# Patient Record
Sex: MALE | Race: WHITE | NOT HISPANIC OR LATINO | Employment: FULL TIME | URBAN - METROPOLITAN AREA
[De-identification: names, ages, dates, MRNs, and addresses within clinical notes are randomized per-mention and may not be internally consistent; named-entity substitution may affect disease eponyms.]

---

## 2017-04-26 ENCOUNTER — AMBULATORY - HEALTHEAST (OUTPATIENT)
Dept: LAB | Facility: CLINIC | Age: 42
End: 2017-04-26

## 2017-04-26 DIAGNOSIS — Z30.09 VASECTOMY EVALUATION: ICD-10-CM

## 2017-04-28 ENCOUNTER — AMBULATORY - HEALTHEAST (OUTPATIENT)
Dept: LAB | Facility: CLINIC | Age: 42
End: 2017-04-28

## 2017-04-28 DIAGNOSIS — Z30.09 VASECTOMY EVALUATION: ICD-10-CM

## 2017-08-23 ENCOUNTER — OFFICE VISIT - HEALTHEAST (OUTPATIENT)
Dept: FAMILY MEDICINE | Facility: CLINIC | Age: 42
End: 2017-08-23

## 2017-08-23 DIAGNOSIS — M77.11 LATERAL EPICONDYLITIS OF RIGHT ELBOW: ICD-10-CM

## 2017-08-23 ASSESSMENT — MIFFLIN-ST. JEOR: SCORE: 1971.84

## 2017-10-09 ENCOUNTER — AMBULATORY - HEALTHEAST (OUTPATIENT)
Dept: LAB | Facility: CLINIC | Age: 42
End: 2017-10-09

## 2017-10-09 DIAGNOSIS — Z13.9 SCREENING FOR CONDITION: ICD-10-CM

## 2017-10-10 ENCOUNTER — COMMUNICATION - HEALTHEAST (OUTPATIENT)
Dept: LAB | Facility: CLINIC | Age: 42
End: 2017-10-10

## 2017-12-06 ENCOUNTER — RECORDS - HEALTHEAST (OUTPATIENT)
Dept: ADMINISTRATIVE | Facility: OTHER | Age: 42
End: 2017-12-06

## 2017-12-28 ENCOUNTER — COMMUNICATION - HEALTHEAST (OUTPATIENT)
Dept: INTERNAL MEDICINE | Facility: CLINIC | Age: 42
End: 2017-12-28

## 2017-12-28 ENCOUNTER — RECORDS - HEALTHEAST (OUTPATIENT)
Dept: ADMINISTRATIVE | Facility: OTHER | Age: 42
End: 2017-12-28

## 2018-01-11 ENCOUNTER — OFFICE VISIT - HEALTHEAST (OUTPATIENT)
Dept: INTERNAL MEDICINE | Facility: CLINIC | Age: 43
End: 2018-01-11

## 2018-01-11 DIAGNOSIS — E78.1 HYPERTRIGLYCERIDEMIA: ICD-10-CM

## 2018-01-11 DIAGNOSIS — E11.9 TYPE 2 DIABETES MELLITUS WITHOUT COMPLICATION, WITHOUT LONG-TERM CURRENT USE OF INSULIN (H): ICD-10-CM

## 2018-01-11 DIAGNOSIS — E88.810 DYSMETABOLIC SYNDROME: ICD-10-CM

## 2018-01-11 DIAGNOSIS — R79.89 ELEVATED LFTS: ICD-10-CM

## 2018-01-11 LAB
ALBUMIN SERPL-MCNC: 4.1 G/DL (ref 3.5–5)
ALBUMIN UR-MCNC: NEGATIVE MG/DL
ALP SERPL-CCNC: 81 U/L (ref 45–120)
ALT SERPL W P-5'-P-CCNC: 110 U/L (ref 0–45)
ANION GAP SERPL CALCULATED.3IONS-SCNC: 10 MMOL/L (ref 5–18)
APPEARANCE UR: CLEAR
AST SERPL W P-5'-P-CCNC: 51 U/L (ref 0–40)
BILIRUB SERPL-MCNC: 0.6 MG/DL (ref 0–1)
BILIRUB UR QL STRIP: NEGATIVE
BUN SERPL-MCNC: 14 MG/DL (ref 8–22)
CALCIUM SERPL-MCNC: 10 MG/DL (ref 8.5–10.5)
CHLORIDE BLD-SCNC: 99 MMOL/L (ref 98–107)
CO2 SERPL-SCNC: 27 MMOL/L (ref 22–31)
COLOR UR AUTO: YELLOW
CREAT SERPL-MCNC: 0.91 MG/DL (ref 0.7–1.3)
CREAT UR-MCNC: 157.5 MG/DL
GFR SERPL CREATININE-BSD FRML MDRD: >60 ML/MIN/1.73M2
GLUCOSE BLD-MCNC: 181 MG/DL (ref 70–125)
GLUCOSE UR STRIP-MCNC: ABNORMAL MG/DL
HBA1C MFR BLD: 7.3 % (ref 3.5–6)
HGB UR QL STRIP: NEGATIVE
KETONES UR STRIP-MCNC: NEGATIVE MG/DL
LDLC SERPL CALC-MCNC: 174 MG/DL
LEUKOCYTE ESTERASE UR QL STRIP: NEGATIVE
MICROALBUMIN UR-MCNC: 1.61 MG/DL (ref 0–1.99)
MICROALBUMIN/CREAT UR: 10.2 MG/G
NITRATE UR QL: NEGATIVE
PH UR STRIP: 6 [PH] (ref 5–8)
POTASSIUM BLD-SCNC: 4.3 MMOL/L (ref 3.5–5)
PROT SERPL-MCNC: 7.3 G/DL (ref 6–8)
SODIUM SERPL-SCNC: 136 MMOL/L (ref 136–145)
SP GR UR STRIP: 1.02 (ref 1–1.03)
UROBILINOGEN UR STRIP-ACNC: ABNORMAL

## 2018-01-11 ASSESSMENT — MIFFLIN-ST. JEOR: SCORE: 2072.76

## 2018-01-12 ENCOUNTER — COMMUNICATION - HEALTHEAST (OUTPATIENT)
Dept: INTERNAL MEDICINE | Facility: CLINIC | Age: 43
End: 2018-01-12

## 2018-01-12 LAB
HBV SURFACE AG SERPL QL IA: NEGATIVE
HCV AB SERPL QL IA: NEGATIVE

## 2018-04-30 ENCOUNTER — OFFICE VISIT - HEALTHEAST (OUTPATIENT)
Dept: INTERNAL MEDICINE | Facility: CLINIC | Age: 43
End: 2018-04-30

## 2018-04-30 DIAGNOSIS — E11.9 TYPE 2 DIABETES MELLITUS (H): ICD-10-CM

## 2018-04-30 DIAGNOSIS — E55.9 VITAMIN D DEFICIENCY: ICD-10-CM

## 2018-04-30 DIAGNOSIS — E78.5 HYPERLIPEMIA: ICD-10-CM

## 2018-04-30 DIAGNOSIS — R73.9 HYPERGLYCEMIA: ICD-10-CM

## 2018-04-30 LAB
ALBUMIN SERPL-MCNC: 4.1 G/DL (ref 3.5–5)
ALP SERPL-CCNC: 95 U/L (ref 45–120)
ALT SERPL W P-5'-P-CCNC: 115 U/L (ref 0–45)
ANION GAP SERPL CALCULATED.3IONS-SCNC: 8 MMOL/L (ref 5–18)
AST SERPL W P-5'-P-CCNC: 60 U/L (ref 0–40)
BILIRUB SERPL-MCNC: 0.4 MG/DL (ref 0–1)
BUN SERPL-MCNC: 13 MG/DL (ref 8–22)
CALCIUM SERPL-MCNC: 9.4 MG/DL (ref 8.5–10.5)
CHLORIDE BLD-SCNC: 105 MMOL/L (ref 98–107)
CHOLEST SERPL-MCNC: 142 MG/DL
CO2 SERPL-SCNC: 26 MMOL/L (ref 22–31)
CREAT SERPL-MCNC: 0.91 MG/DL (ref 0.7–1.3)
FASTING STATUS PATIENT QL REPORTED: YES
GFR SERPL CREATININE-BSD FRML MDRD: >60 ML/MIN/1.73M2
GLUCOSE BLD-MCNC: 149 MG/DL (ref 70–125)
HBA1C MFR BLD: 7.2 % (ref 3.5–6)
HDLC SERPL-MCNC: 43 MG/DL
LDLC SERPL CALC-MCNC: 71 MG/DL
POTASSIUM BLD-SCNC: 5.1 MMOL/L (ref 3.5–5)
PROT SERPL-MCNC: 7.3 G/DL (ref 6–8)
SODIUM SERPL-SCNC: 139 MMOL/L (ref 136–145)
TRIGL SERPL-MCNC: 142 MG/DL

## 2018-04-30 ASSESSMENT — MIFFLIN-ST. JEOR: SCORE: 2050.37

## 2018-05-01 ENCOUNTER — COMMUNICATION - HEALTHEAST (OUTPATIENT)
Dept: INTERNAL MEDICINE | Facility: CLINIC | Age: 43
End: 2018-05-01

## 2018-05-01 LAB — 25(OH)D3 SERPL-MCNC: 48.2 NG/ML (ref 30–80)

## 2018-07-05 ENCOUNTER — COMMUNICATION - HEALTHEAST (OUTPATIENT)
Dept: INTERNAL MEDICINE | Facility: CLINIC | Age: 43
End: 2018-07-05

## 2018-07-06 ENCOUNTER — COMMUNICATION - HEALTHEAST (OUTPATIENT)
Dept: INTERNAL MEDICINE | Facility: CLINIC | Age: 43
End: 2018-07-06

## 2018-10-15 ENCOUNTER — OFFICE VISIT - HEALTHEAST (OUTPATIENT)
Dept: INTERNAL MEDICINE | Facility: CLINIC | Age: 43
End: 2018-10-15

## 2018-10-15 ENCOUNTER — COMMUNICATION - HEALTHEAST (OUTPATIENT)
Dept: INTERNAL MEDICINE | Facility: CLINIC | Age: 43
End: 2018-10-15

## 2018-10-15 DIAGNOSIS — E88.810 DYSMETABOLIC SYNDROME: ICD-10-CM

## 2018-10-15 DIAGNOSIS — E11.9 TYPE 2 DIABETES MELLITUS WITHOUT COMPLICATION, WITHOUT LONG-TERM CURRENT USE OF INSULIN (H): ICD-10-CM

## 2018-10-15 DIAGNOSIS — E78.5 HYPERLIPIDEMIA: ICD-10-CM

## 2018-10-15 LAB
ALBUMIN SERPL-MCNC: 4.4 G/DL (ref 3.5–5)
ALP SERPL-CCNC: 95 U/L (ref 45–120)
ALT SERPL W P-5'-P-CCNC: 89 U/L (ref 0–45)
ANION GAP SERPL CALCULATED.3IONS-SCNC: 11 MMOL/L (ref 5–18)
AST SERPL W P-5'-P-CCNC: 49 U/L (ref 0–40)
BILIRUB SERPL-MCNC: 0.8 MG/DL (ref 0–1)
BUN SERPL-MCNC: 13 MG/DL (ref 8–22)
CALCIUM SERPL-MCNC: 9.6 MG/DL (ref 8.5–10.5)
CHLORIDE BLD-SCNC: 100 MMOL/L (ref 98–107)
CHOLEST SERPL-MCNC: 137 MG/DL
CO2 SERPL-SCNC: 27 MMOL/L (ref 22–31)
CREAT SERPL-MCNC: 0.95 MG/DL (ref 0.7–1.3)
FASTING STATUS PATIENT QL REPORTED: YES
GFR SERPL CREATININE-BSD FRML MDRD: >60 ML/MIN/1.73M2
GLUCOSE BLD-MCNC: 138 MG/DL (ref 70–125)
HBA1C MFR BLD: 7.8 % (ref 3.5–6)
HDLC SERPL-MCNC: 40 MG/DL
LDLC SERPL CALC-MCNC: 54 MG/DL
POTASSIUM BLD-SCNC: 4 MMOL/L (ref 3.5–5)
PROT SERPL-MCNC: 7.2 G/DL (ref 6–8)
SODIUM SERPL-SCNC: 138 MMOL/L (ref 136–145)
TRIGL SERPL-MCNC: 215 MG/DL

## 2018-10-15 ASSESSMENT — MIFFLIN-ST. JEOR: SCORE: 2053.49

## 2018-10-16 ENCOUNTER — COMMUNICATION - HEALTHEAST (OUTPATIENT)
Dept: INTERNAL MEDICINE | Facility: CLINIC | Age: 43
End: 2018-10-16

## 2018-10-18 ENCOUNTER — COMMUNICATION - HEALTHEAST (OUTPATIENT)
Dept: PHARMACY | Facility: CLINIC | Age: 43
End: 2018-10-18

## 2018-10-19 ENCOUNTER — OFFICE VISIT - HEALTHEAST (OUTPATIENT)
Dept: PHARMACY | Facility: CLINIC | Age: 43
End: 2018-10-19

## 2018-10-19 DIAGNOSIS — E78.1 HYPERTRIGLYCERIDEMIA: ICD-10-CM

## 2018-10-19 DIAGNOSIS — E55.9 VITAMIN D DEFICIENCY: ICD-10-CM

## 2018-10-19 DIAGNOSIS — E11.8 TYPE 2 DIABETES MELLITUS WITH COMPLICATION, WITHOUT LONG-TERM CURRENT USE OF INSULIN (H): ICD-10-CM

## 2018-11-01 ENCOUNTER — COMMUNICATION - HEALTHEAST (OUTPATIENT)
Dept: PHARMACY | Facility: CLINIC | Age: 43
End: 2018-11-01

## 2019-01-10 ENCOUNTER — COMMUNICATION - HEALTHEAST (OUTPATIENT)
Dept: INTERNAL MEDICINE | Facility: CLINIC | Age: 44
End: 2019-01-10

## 2019-01-10 DIAGNOSIS — E11.9 TYPE 2 DIABETES MELLITUS WITHOUT COMPLICATION, WITHOUT LONG-TERM CURRENT USE OF INSULIN (H): ICD-10-CM

## 2019-01-14 ENCOUNTER — COMMUNICATION - HEALTHEAST (OUTPATIENT)
Dept: NURSING | Facility: CLINIC | Age: 44
End: 2019-01-14

## 2019-01-14 DIAGNOSIS — E11.8 TYPE 2 DIABETES MELLITUS WITH COMPLICATION, WITHOUT LONG-TERM CURRENT USE OF INSULIN (H): ICD-10-CM

## 2019-01-18 ENCOUNTER — COMMUNICATION - HEALTHEAST (OUTPATIENT)
Dept: INTERNAL MEDICINE | Facility: CLINIC | Age: 44
End: 2019-01-18

## 2019-01-18 ENCOUNTER — AMBULATORY - HEALTHEAST (OUTPATIENT)
Dept: LAB | Facility: CLINIC | Age: 44
End: 2019-01-18

## 2019-01-18 DIAGNOSIS — E11.8 TYPE 2 DIABETES MELLITUS WITH COMPLICATION, WITHOUT LONG-TERM CURRENT USE OF INSULIN (H): ICD-10-CM

## 2019-01-18 LAB — HBA1C MFR BLD: 7.2 % (ref 3.5–6)

## 2019-03-18 ENCOUNTER — OFFICE VISIT (OUTPATIENT)
Dept: URGENT CARE | Facility: URGENT CARE | Age: 44
End: 2019-03-18

## 2019-03-18 VITALS
HEART RATE: 82 BPM | DIASTOLIC BLOOD PRESSURE: 84 MMHG | SYSTOLIC BLOOD PRESSURE: 130 MMHG | WEIGHT: 244.8 LBS | TEMPERATURE: 97.7 F | OXYGEN SATURATION: 95 %

## 2019-03-18 DIAGNOSIS — M25.511 ACUTE PAIN OF RIGHT SHOULDER: Primary | ICD-10-CM

## 2019-03-18 PROCEDURE — 99203 OFFICE O/P NEW LOW 30 MIN: CPT | Performed by: PHYSICIAN ASSISTANT

## 2019-03-18 RX ORDER — CHLORAL HYDRATE 500 MG
1000 CAPSULE ORAL DAILY
COMMUNITY

## 2019-03-18 RX ORDER — MULTIVIT-MIN/IRON/FOLIC ACID/K 18-600-40
2000 CAPSULE ORAL DAILY
COMMUNITY
End: 2023-07-20

## 2019-03-18 RX ORDER — ATORVASTATIN CALCIUM 20 MG/1
20 TABLET, FILM COATED ORAL DAILY
Refills: 3 | COMMUNITY
Start: 2019-01-09 | End: 2022-01-23

## 2019-03-18 RX ORDER — ASPIRIN 81 MG/1
81 TABLET, CHEWABLE ORAL DAILY
COMMUNITY
End: 2021-11-08

## 2019-03-18 RX ORDER — MELOXICAM 15 MG/1
15 TABLET ORAL DAILY
Qty: 20 TABLET | Refills: 0 | Status: SHIPPED | OUTPATIENT
Start: 2019-03-18 | End: 2021-11-08

## 2019-03-18 RX ORDER — METFORMIN HCL 500 MG
2000 TABLET, EXTENDED RELEASE 24 HR ORAL DAILY
Refills: 3 | COMMUNITY
Start: 2019-01-14 | End: 2022-01-23

## 2019-03-18 NOTE — PROGRESS NOTES
SUBJECTIVE:  Chief Complaint   Patient presents with     Urgent Care     Musculoskeletal Problem     Started:7-10 days ago, Sx:Headaches, neck and shoulder pain sensitivity to cold on the back molars of the mouth, pain in the jaw and back of head down to the shoulder, arm and into the back, tingling and numbness down the right arm into the fingers, light headedness Tx:heating pad on neck and back at night, Tylenol; last dose Saturday morning, had 20 minute chair massage last Wednesday, stretching.     Arturo Mei is a 43 year old male who presents with a chief complaint of right sided neck pain. Patient reports that for the past 7-10 days he has been experiencing right sided pain from his neck down to his arm. He denies having any injury to the area or doing any new activity.   Today he developed intermittent tingling into his fingertips, the sensation has now gone away. HE has taken Tylenol for his pain without improvement. He denies fever, chills, weight loss, chest pain, shortness of breath, hemoptysis or rash.       History reviewed. No pertinent past medical history.  Current Outpatient Medications   Medication Sig Dispense Refill     aspirin (ASA) 81 MG chewable tablet Take 81 mg by mouth daily       atorvastatin (LIPITOR) 20 MG tablet Take 20 mg by mouth daily  3     Cholecalciferol (VITAMIN D) 2000 units CAPS Take 2,000 Units by mouth daily       fish oil-omega-3 fatty acids 1000 MG capsule Take 1,000 mg by mouth daily       metFORMIN (GLUCOPHAGE-XR) 500 MG 24 hr tablet Take 2,000 mg by mouth daily  3     Social History     Tobacco Use     Smoking status: Never Smoker     Smokeless tobacco: Never Used   Substance Use Topics     Alcohol use: Not on file       ROS:  Review of systems negative except as stated above.    EXAM:   /84 (BP Location: Right arm, Patient Position: Sitting, Cuff Size: Adult Large)   Pulse 82   Temp 97.7  F (36.5  C) (Tympanic)   Wt 111 kg (244 lb 12.8 oz)   SpO2 95%    M/S Exam:Right shoulder has TTP, +impingement sign. FROM. No redness or swelling.   GENERAL APPEARANCE: healthy, alert and no distress  EXTREMITIES: peripheral pulses normal  SKIN: no suspicious lesions or rashes  NEURO: Normal strength and tone, sensory exam grossly normal, mentation intact and speech normal    X-RAY was not done.    ASSESSMENT / PLAN:  1. Acute pain of right shoulder  Patient with shoulder pain for the past 10 days, now having symptoms of intermittent nerve compression with tingling into his fingertips. I do not think that this is cardiac or pulmonary related referred pain, as patient is TTP in right shoulder. Encouraged supportive cares, RISE treatment and ice. Mobic RX given for pain, patient is apprehensive about taking NSAIDs and DM. For a short time (5-10d), he should be fine taking Mobic. Follow-up with ortho for re-check in the next week if symptoms do not improve.   - meloxicam (MOBIC) 15 MG tablet; Take 1 tablet (15 mg) by mouth daily  Dispense: 20 tablet; Refill: 0  - ORTHO  REFERRAL    I have discussed the patient's diagnosis and my plan of treatment with the patient. We went over any labs or imaging. Patient is aware to come back in with worsening symptoms or if no relief despite treatment plan.  Patient verbalizes understanding. All questions were addressed and answered.   India Templeton PA-C

## 2019-04-15 ENCOUNTER — OFFICE VISIT - HEALTHEAST (OUTPATIENT)
Dept: INTERNAL MEDICINE | Facility: CLINIC | Age: 44
End: 2019-04-15

## 2019-04-15 DIAGNOSIS — E11.8 TYPE 2 DIABETES MELLITUS WITH COMPLICATION, WITHOUT LONG-TERM CURRENT USE OF INSULIN (H): ICD-10-CM

## 2019-04-15 DIAGNOSIS — K76.0 HEPATIC STEATOSIS: ICD-10-CM

## 2019-04-15 DIAGNOSIS — E78.5 HYPERLIPIDEMIA, UNSPECIFIED HYPERLIPIDEMIA TYPE: ICD-10-CM

## 2019-04-15 LAB
ALBUMIN SERPL-MCNC: 4.2 G/DL (ref 3.5–5)
ALP SERPL-CCNC: 85 U/L (ref 45–120)
ALT SERPL W P-5'-P-CCNC: 102 U/L (ref 0–45)
AST SERPL W P-5'-P-CCNC: 53 U/L (ref 0–40)
BILIRUB DIRECT SERPL-MCNC: 0.2 MG/DL
BILIRUB SERPL-MCNC: 0.4 MG/DL (ref 0–1)
CREAT UR-MCNC: 151.4 MG/DL
HBA1C MFR BLD: 7.4 % (ref 3.5–6)
MICROALBUMIN UR-MCNC: 2.29 MG/DL (ref 0–1.99)
MICROALBUMIN/CREAT UR: 15.1 MG/G
PROT SERPL-MCNC: 7.1 G/DL (ref 6–8)

## 2019-04-15 ASSESSMENT — MIFFLIN-ST. JEOR: SCORE: 2053.77

## 2019-06-11 ENCOUNTER — COMMUNICATION - HEALTHEAST (OUTPATIENT)
Dept: INTERNAL MEDICINE | Facility: CLINIC | Age: 44
End: 2019-06-11

## 2019-06-11 DIAGNOSIS — E11.8 TYPE 2 DIABETES MELLITUS WITH COMPLICATION, WITHOUT LONG-TERM CURRENT USE OF INSULIN (H): ICD-10-CM

## 2019-07-09 ENCOUNTER — RECORDS - HEALTHEAST (OUTPATIENT)
Dept: ADMINISTRATIVE | Facility: OTHER | Age: 44
End: 2019-07-09

## 2019-07-09 LAB — RETINOPATHY: NEGATIVE

## 2019-07-15 ENCOUNTER — RECORDS - HEALTHEAST (OUTPATIENT)
Dept: HEALTH INFORMATION MANAGEMENT | Facility: CLINIC | Age: 44
End: 2019-07-15

## 2019-09-22 ENCOUNTER — COMMUNICATION - HEALTHEAST (OUTPATIENT)
Dept: INTERNAL MEDICINE | Facility: CLINIC | Age: 44
End: 2019-09-22

## 2019-11-04 ENCOUNTER — COMMUNICATION - HEALTHEAST (OUTPATIENT)
Dept: INTERNAL MEDICINE | Facility: CLINIC | Age: 44
End: 2019-11-04

## 2019-11-04 DIAGNOSIS — Z00.00 ROUTINE HEALTH MAINTENANCE: ICD-10-CM

## 2019-11-05 ENCOUNTER — AMBULATORY - HEALTHEAST (OUTPATIENT)
Dept: INTERNAL MEDICINE | Facility: CLINIC | Age: 44
End: 2019-11-05

## 2019-11-05 DIAGNOSIS — E11.65 TYPE 2 DIABETES MELLITUS WITH HYPERGLYCEMIA, WITHOUT LONG-TERM CURRENT USE OF INSULIN (H): ICD-10-CM

## 2019-11-05 DIAGNOSIS — Z51.81 ENCOUNTER FOR THERAPEUTIC DRUG MONITORING: ICD-10-CM

## 2019-11-05 DIAGNOSIS — E78.2 MIXED HYPERLIPIDEMIA: ICD-10-CM

## 2019-11-08 ENCOUNTER — AMBULATORY - HEALTHEAST (OUTPATIENT)
Dept: LAB | Facility: CLINIC | Age: 44
End: 2019-11-08

## 2019-11-08 DIAGNOSIS — E78.2 MIXED HYPERLIPIDEMIA: ICD-10-CM

## 2019-11-08 DIAGNOSIS — E11.65 TYPE 2 DIABETES MELLITUS WITH HYPERGLYCEMIA, WITHOUT LONG-TERM CURRENT USE OF INSULIN (H): ICD-10-CM

## 2019-11-08 DIAGNOSIS — Z51.81 ENCOUNTER FOR THERAPEUTIC DRUG MONITORING: ICD-10-CM

## 2019-11-08 LAB
ALBUMIN SERPL-MCNC: 4.2 G/DL (ref 3.5–5)
ALP SERPL-CCNC: 78 U/L (ref 45–120)
ALT SERPL W P-5'-P-CCNC: 99 U/L (ref 0–45)
ANION GAP SERPL CALCULATED.3IONS-SCNC: 9 MMOL/L (ref 5–18)
AST SERPL W P-5'-P-CCNC: 54 U/L (ref 0–40)
BILIRUB SERPL-MCNC: 0.6 MG/DL (ref 0–1)
BUN SERPL-MCNC: 14 MG/DL (ref 8–22)
CALCIUM SERPL-MCNC: 9.3 MG/DL (ref 8.5–10.5)
CHLORIDE BLD-SCNC: 102 MMOL/L (ref 98–107)
CHOLEST SERPL-MCNC: 157 MG/DL
CO2 SERPL-SCNC: 26 MMOL/L (ref 22–31)
CREAT SERPL-MCNC: 0.98 MG/DL (ref 0.7–1.3)
CREAT UR-MCNC: 260.8 MG/DL
ERYTHROCYTE [DISTWIDTH] IN BLOOD BY AUTOMATED COUNT: 11 % (ref 11–14.5)
FASTING STATUS PATIENT QL REPORTED: YES
GFR SERPL CREATININE-BSD FRML MDRD: >60 ML/MIN/1.73M2
GLUCOSE BLD-MCNC: 176 MG/DL (ref 70–125)
HBA1C MFR BLD: 7.5 % (ref 3.5–6)
HCT VFR BLD AUTO: 44.6 % (ref 40–54)
HDLC SERPL-MCNC: 42 MG/DL
HGB BLD-MCNC: 15.1 G/DL (ref 14–18)
LDLC SERPL CALC-MCNC: 73 MG/DL
MCH RBC QN AUTO: 29.8 PG (ref 27–34)
MCHC RBC AUTO-ENTMCNC: 33.9 G/DL (ref 32–36)
MCV RBC AUTO: 88 FL (ref 80–100)
MICROALBUMIN UR-MCNC: 3.73 MG/DL (ref 0–1.99)
MICROALBUMIN/CREAT UR: 14.3 MG/G
PLATELET # BLD AUTO: 174 THOU/UL (ref 140–440)
PMV BLD AUTO: 8.3 FL (ref 7–10)
POTASSIUM BLD-SCNC: 4.5 MMOL/L (ref 3.5–5)
PROT SERPL-MCNC: 6.9 G/DL (ref 6–8)
RBC # BLD AUTO: 5.09 MILL/UL (ref 4.4–6.2)
SODIUM SERPL-SCNC: 137 MMOL/L (ref 136–145)
TRIGL SERPL-MCNC: 208 MG/DL
WBC: 4.6 THOU/UL (ref 4–11)

## 2019-11-13 ENCOUNTER — OFFICE VISIT - HEALTHEAST (OUTPATIENT)
Dept: INTERNAL MEDICINE | Facility: CLINIC | Age: 44
End: 2019-11-13

## 2019-11-13 DIAGNOSIS — E78.2 MIXED HYPERLIPIDEMIA: ICD-10-CM

## 2019-11-13 DIAGNOSIS — E78.1 HYPERTRIGLYCERIDEMIA: ICD-10-CM

## 2019-11-13 DIAGNOSIS — G47.33 OSA (OBSTRUCTIVE SLEEP APNEA): ICD-10-CM

## 2019-11-13 DIAGNOSIS — E11.69 TYPE 2 DIABETES MELLITUS WITH OTHER SPECIFIED COMPLICATION, WITHOUT LONG-TERM CURRENT USE OF INSULIN (H): ICD-10-CM

## 2019-11-13 ASSESSMENT — MIFFLIN-ST. JEOR: SCORE: 2052.92

## 2019-12-09 ENCOUNTER — APPOINTMENT (OUTPATIENT)
Dept: CT IMAGING | Facility: CLINIC | Age: 44
End: 2019-12-09
Attending: FAMILY MEDICINE
Payer: COMMERCIAL

## 2019-12-09 ENCOUNTER — HOSPITAL ENCOUNTER (EMERGENCY)
Facility: CLINIC | Age: 44
Discharge: HOME | End: 2019-12-09
Attending: FAMILY MEDICINE | Admitting: FAMILY MEDICINE
Payer: COMMERCIAL

## 2019-12-09 ENCOUNTER — APPOINTMENT (OUTPATIENT)
Dept: GENERAL RADIOLOGY | Facility: CLINIC | Age: 44
End: 2019-12-09
Attending: FAMILY MEDICINE
Payer: COMMERCIAL

## 2019-12-09 ENCOUNTER — RECORDS - HEALTHEAST (OUTPATIENT)
Dept: ADMINISTRATIVE | Facility: OTHER | Age: 44
End: 2019-12-09

## 2019-12-09 VITALS
DIASTOLIC BLOOD PRESSURE: 92 MMHG | OXYGEN SATURATION: 100 % | RESPIRATION RATE: 16 BRPM | SYSTOLIC BLOOD PRESSURE: 137 MMHG | WEIGHT: 244.56 LBS | HEART RATE: 84 BPM

## 2019-12-09 DIAGNOSIS — M54.9 UPPER BACK PAIN: ICD-10-CM

## 2019-12-09 DIAGNOSIS — R09.89 UPPER RESPIRATORY SYMPTOM: ICD-10-CM

## 2019-12-09 DIAGNOSIS — R07.89 OTHER CHEST PAIN: ICD-10-CM

## 2019-12-09 DIAGNOSIS — R07.9 LEFT-SIDED CHEST PAIN: ICD-10-CM

## 2019-12-09 LAB
ALBUMIN SERPL-MCNC: 4.3 G/DL (ref 3.4–5)
ALP SERPL-CCNC: 96 U/L (ref 40–150)
ALT SERPL W P-5'-P-CCNC: 87 U/L (ref 0–70)
ANION GAP SERPL CALCULATED.3IONS-SCNC: 10 MMOL/L (ref 3–14)
APTT PPP: 27 SEC (ref 22–37)
AST SERPL W P-5'-P-CCNC: 40 U/L (ref 0–45)
BASOPHILS # BLD AUTO: 0 10E9/L (ref 0–0.2)
BASOPHILS NFR BLD AUTO: 0.5 %
BILIRUB SERPL-MCNC: 0.5 MG/DL (ref 0.2–1.3)
BUN SERPL-MCNC: 13 MG/DL (ref 7–30)
CALCIUM SERPL-MCNC: 9.2 MG/DL (ref 8.5–10.1)
CHLORIDE SERPL-SCNC: 98 MMOL/L (ref 94–109)
CO2 SERPL-SCNC: 26 MMOL/L (ref 20–32)
CREAT SERPL-MCNC: 0.83 MG/DL (ref 0.66–1.25)
DIFFERENTIAL METHOD BLD: NORMAL
EOSINOPHIL # BLD AUTO: 0.1 10E9/L (ref 0–0.7)
EOSINOPHIL NFR BLD AUTO: 1.1 %
ERYTHROCYTE [DISTWIDTH] IN BLOOD BY AUTOMATED COUNT: 12.1 % (ref 10–15)
GFR SERPL CREATININE-BSD FRML MDRD: >90 ML/MIN/{1.73_M2}
GLUCOSE SERPL-MCNC: 155 MG/DL (ref 70–99)
HCT VFR BLD AUTO: 43 % (ref 40–53)
HGB BLD-MCNC: 14.8 G/DL (ref 13.3–17.7)
IMM GRANULOCYTES # BLD: 0 10E9/L (ref 0–0.4)
IMM GRANULOCYTES NFR BLD: 0.2 %
INR PPP: 1.06 (ref 0.86–1.14)
LIPASE SERPL-CCNC: 105 U/L (ref 73–393)
LYMPHOCYTES # BLD AUTO: 2 10E9/L (ref 0.8–5.3)
LYMPHOCYTES NFR BLD AUTO: 30.5 %
MCH RBC QN AUTO: 30 PG (ref 26.5–33)
MCHC RBC AUTO-ENTMCNC: 34.4 G/DL (ref 31.5–36.5)
MCV RBC AUTO: 87 FL (ref 78–100)
MONOCYTES # BLD AUTO: 0.3 10E9/L (ref 0–1.3)
MONOCYTES NFR BLD AUTO: 5 %
NEUTROPHILS # BLD AUTO: 4.2 10E9/L (ref 1.6–8.3)
NEUTROPHILS NFR BLD AUTO: 62.7 %
NRBC # BLD AUTO: 0 10*3/UL
NRBC BLD AUTO-RTO: 0 /100
NT-PROBNP SERPL-MCNC: 34 PG/ML (ref 0–450)
PLATELET # BLD AUTO: 191 10E9/L (ref 150–450)
POTASSIUM SERPL-SCNC: 3.7 MMOL/L (ref 3.4–5.3)
PROT SERPL-MCNC: 8.1 G/DL (ref 6.8–8.8)
RBC # BLD AUTO: 4.94 10E12/L (ref 4.4–5.9)
SODIUM SERPL-SCNC: 134 MMOL/L (ref 133–144)
TROPONIN I BLD-MCNC: 0.01 UG/L (ref 0–0.08)
TROPONIN I SERPL-MCNC: <0.015 UG/L (ref 0–0.04)
TROPONIN I SERPL-MCNC: <0.015 UG/L (ref 0–0.04)
WBC # BLD AUTO: 6.6 10E9/L (ref 4–11)

## 2019-12-09 PROCEDURE — 99285 EMERGENCY DEPT VISIT HI MDM: CPT | Mod: 25

## 2019-12-09 PROCEDURE — 93010 ELECTROCARDIOGRAM REPORT: CPT | Mod: 59 | Performed by: FAMILY MEDICINE

## 2019-12-09 PROCEDURE — 80053 COMPREHEN METABOLIC PANEL: CPT | Performed by: FAMILY MEDICINE

## 2019-12-09 PROCEDURE — 99285 EMERGENCY DEPT VISIT HI MDM: CPT | Mod: 25 | Performed by: FAMILY MEDICINE

## 2019-12-09 PROCEDURE — 85730 THROMBOPLASTIN TIME PARTIAL: CPT | Performed by: FAMILY MEDICINE

## 2019-12-09 PROCEDURE — 71045 X-RAY EXAM CHEST 1 VIEW: CPT

## 2019-12-09 PROCEDURE — 84484 ASSAY OF TROPONIN QUANT: CPT | Performed by: FAMILY MEDICINE

## 2019-12-09 PROCEDURE — 83690 ASSAY OF LIPASE: CPT | Performed by: FAMILY MEDICINE

## 2019-12-09 PROCEDURE — 84484 ASSAY OF TROPONIN QUANT: CPT | Mod: 91

## 2019-12-09 PROCEDURE — 85025 COMPLETE CBC W/AUTO DIFF WBC: CPT | Performed by: FAMILY MEDICINE

## 2019-12-09 PROCEDURE — 83880 ASSAY OF NATRIURETIC PEPTIDE: CPT | Performed by: FAMILY MEDICINE

## 2019-12-09 PROCEDURE — 25000132 ZZH RX MED GY IP 250 OP 250 PS 637: Performed by: FAMILY MEDICINE

## 2019-12-09 PROCEDURE — 93005 ELECTROCARDIOGRAM TRACING: CPT

## 2019-12-09 PROCEDURE — 71275 CT ANGIOGRAPHY CHEST: CPT

## 2019-12-09 PROCEDURE — 25000128 H RX IP 250 OP 636: Performed by: FAMILY MEDICINE

## 2019-12-09 PROCEDURE — 93308 TTE F-UP OR LMTD: CPT | Mod: 26 | Performed by: FAMILY MEDICINE

## 2019-12-09 PROCEDURE — 93308 TTE F-UP OR LMTD: CPT

## 2019-12-09 PROCEDURE — 25800030 ZZH RX IP 258 OP 636: Performed by: FAMILY MEDICINE

## 2019-12-09 PROCEDURE — 85610 PROTHROMBIN TIME: CPT | Performed by: FAMILY MEDICINE

## 2019-12-09 RX ORDER — IOPAMIDOL 755 MG/ML
100 INJECTION, SOLUTION INTRAVASCULAR ONCE
Status: COMPLETED | OUTPATIENT
Start: 2019-12-09 | End: 2019-12-09

## 2019-12-09 RX ORDER — NITROGLYCERIN 0.4 MG/1
0.4 TABLET SUBLINGUAL EVERY 5 MIN PRN
Status: DISCONTINUED | OUTPATIENT
Start: 2019-12-09 | End: 2019-12-09 | Stop reason: HOSPADM

## 2019-12-09 RX ORDER — LIDOCAINE 40 MG/G
CREAM TOPICAL
Status: DISCONTINUED | OUTPATIENT
Start: 2019-12-09 | End: 2019-12-09 | Stop reason: HOSPADM

## 2019-12-09 RX ORDER — ASPIRIN 81 MG/1
324 TABLET, CHEWABLE ORAL ONCE
Status: COMPLETED | OUTPATIENT
Start: 2019-12-09 | End: 2019-12-09

## 2019-12-09 RX ADMIN — NITROGLYCERIN 0.4 MG: 0.4 TABLET SUBLINGUAL at 12:50

## 2019-12-09 RX ADMIN — SODIUM CHLORIDE 1000 ML: 9 INJECTION, SOLUTION INTRAVENOUS at 14:08

## 2019-12-09 RX ADMIN — ASPIRIN 81 MG CHEWABLE TABLET 324 MG: 81 TABLET CHEWABLE at 12:47

## 2019-12-09 RX ADMIN — IOPAMIDOL 74 ML: 755 INJECTION, SOLUTION INTRAVENOUS at 14:25

## 2019-12-09 ASSESSMENT — ENCOUNTER SYMPTOMS
SHORTNESS OF BREATH: 0
DYSPHORIC MOOD: 0
ARTHRALGIAS: 0
HEADACHES: 0
SINUS PRESSURE: 0
BACK PAIN: 1
ABDOMINAL PAIN: 0
NECK STIFFNESS: 0
DIFFICULTY URINATING: 0
DIAPHORESIS: 0
NAUSEA: 0
APPETITE CHANGE: 0
SINUS PAIN: 0
VOICE CHANGE: 0
FEVER: 0
CONFUSION: 0
DECREASED CONCENTRATION: 0
COLOR CHANGE: 0
WOUND: 0
ACTIVITY CHANGE: 1
WEAKNESS: 0
TROUBLE SWALLOWING: 0
EYE REDNESS: 0

## 2019-12-09 NOTE — ED PROVIDER NOTES
[facility] [place] EMERGENCY DEPARTMENT ([facility])  12/09/19    History     Chief Complaint   Patient presents with     Chest Pain     Started to have back pain between 0930 and 1000 AM and Cest pain strted around [id number].  Feels like he is being sqeezed and left neck pain for last few days.     History was provided by the patient and medical records.      Arturo Mei is a 44 year old male with a history notable for ALFREDO, DM II and hyperlipidemia who presents for evaluation of chest pain.  Around 9:30 AM this morning patient experienced sudden onset of bilateral thoracic back pain.  Approximately 1 hour later he describes the pain wrapping around to his chest.  He describes it as a squeezing pain versus a sharp, stabbing pain.  During the time he notes feeling flush but denies diaphoresis, nausea, abdominal pain, shortness of breath or leg swelling.  Patient took 2 ibuprofen shortly after the onset of his back pain.  He notes improvement of his chest pain while laying flat.  He denies recent change in his energy levels or activity levels.  He denies familial history of cardiac issues or PE/DVTs.  Per chart review, patient was seen on 12/9/2015 for acute chest pain.  He also noted paresthesia in his extremities.  An EKG was performed and was unremarkable.patient discharged with sx resolving in the past.  No strenuous exercise or rash.  Patient notes he has been fight an upper chest cold for the last week getting better. No reports of hemoptysis or productive sputum.  No leg pain or leg swelling no recent travel etc.    PAST MEDICAL HISTORY  No past medical history on file.  PAST SURGICAL HISTORY  No past surgical history on file.  FAMILY HISTORY  Family History   Problem Relation Age of Onset     Diabetes Mother      Diabetes Paternal Grandmother      Diabetes Sister      SOCIAL HISTORY  Social History     Tobacco Use     Smoking status: Never Smoker     Smokeless tobacco: Never Used   Substance Use Topics      Alcohol use: Not on file     MEDICATIONS  No current facility-administered medications for this encounter.      Current Outpatient Medications   Medication     aspirin (ASA) 81 MG chewable tablet     atorvastatin (LIPITOR) 20 MG tablet     Cholecalciferol (VITAMIN D) 2000 units CAPS     fish oil-omega-3 fatty acids 1000 MG capsule     meloxicam (MOBIC) 15 MG tablet     metFORMIN (GLUCOPHAGE-XR) 500 MG 24 hr tablet     ALLERGIES  No Known Allergies    I have reviewed the Medications, Allergies, Past Medical and Surgical History, and Social History in the Epic system.    Review of Systems   Constitutional: Positive for activity change. Negative for appetite change, diaphoresis and fever.   HENT: Negative for congestion, sinus pressure, sinus pain, trouble swallowing and voice change.    Eyes: Negative for redness.   Respiratory: Negative for shortness of breath.    Cardiovascular: Positive for chest pain (left sided pressure). Negative for leg swelling.   Gastrointestinal: Negative for abdominal pain and nausea.   Genitourinary: Negative for difficulty urinating.   Musculoskeletal: Positive for back pain. Negative for arthralgias and neck stiffness.   Skin: Negative for color change, rash and wound.   Allergic/Immunologic: Negative for immunocompromised state.   Neurological: Negative for syncope, weakness and headaches.   Psychiatric/Behavioral: Negative for confusion, decreased concentration and dysphoric mood.   All other systems reviewed and are negative.      Physical Exam   BP: (!) 159/94  Pulse: 81  Heart Rate: 92  Resp: 18  Weight: 110.9 kg (244 lb 9 oz)  SpO2: 99 %      Physical Exam  Vitals signs and nursing note reviewed.   Constitutional:       General: He is in acute distress.      Appearance: He is well-developed. He is not diaphoretic.      Comments: Patient mildly anxious here alert and oriented having some symptoms on his back and left chest area.   HENT:      Head: Normocephalic and atraumatic.       Comments: No facial swelling  Eyes:      General: No scleral icterus.     Extraocular Movements: Extraocular movements intact.      Conjunctiva/sclera: Conjunctivae normal.      Pupils: Pupils are equal, round, and reactive to light.   Neck:      Musculoskeletal: Normal range of motion and neck supple. No neck rigidity.   Cardiovascular:      Rate and Rhythm: Normal rate and regular rhythm.      Heart sounds: No murmur. No friction rub.   Pulmonary:      Effort: No respiratory distress.      Breath sounds: No wheezing.   Chest:      Chest wall: No tenderness.   Abdominal:      General: Abdomen is flat. There is no distension.      Palpations: Abdomen is soft. There is no mass.      Tenderness: There is no abdominal tenderness.   Musculoskeletal:         General: No swelling or tenderness.      Comments: No edema negative Homans sign   Skin:     General: Skin is warm and dry.      Capillary Refill: Capillary refill takes less than 2 seconds.      Findings: No rash.   Neurological:      General: No focal deficit present.      Mental Status: He is alert and oriented to person, place, and time.   Psychiatric:         Mood and Affect: Mood normal.         Behavior: Behavior normal.         Thought Content: Thought content normal.         Judgment: Judgment normal.      Comments: Mild anxiousness otherwise appropriate           ED Course   12:30 PM  The patient was seen and examined by Dr. Petros Rivero in Room ED 01.      Patient valuate here in the ER.  EKG was done no acute ischemic changes no tachycardia.  Portable chest x-ray done no abnormal mediastinum no effusions pneumothorax no heart failure infiltrate etc.  Laboratory testing troponin x2 done here in the ER were negative.  CBC chemistries otherwise normal.  Patient received aspirin along with nitroglycerin sublingual without significant change his symptoms gradually improved here in the ER.  With his back pain he was somewhat hypertensive we elected to do a  CT scan of the chest ruled out PEs along with any obvious dissection infiltrate etc.  At this point patient offered overnight observation several times but declines at this point he was up ambulating feeling fine much better will follow-up with his internist.  Patient this point would like to go home he feels it is more muscular it certainly could be attributed to his recent of upper respiratory infection that he has had it has been coughing etc. and it may be more musculoskeletal at this point though we still did offer observation with his risk factors etc. he understands but is comfortable going home will return if any worsening symptoms follow-up with his primary physician for recheck discussed with patient since we did do a CT scan with contrast to hold his Glucophage for 2 days and then get a creatinine rechecked before he restarts this per his primary physician he understands this patient discharged as noted will continue aspirin daily until seen by his medical doctor.  Return if any concerns.  Procedures  Results for orders placed during the hospital encounter of 12/09/19   POC US ECHO LIMITED    Impression Limited Bedside Cardiac Ultrasound, performed and interpreted by me.   Indication: Chest Pain.  Parasternal long axis, parasternal short axis and apical 4 chamber views were acquired.   fair    Findings:    Global left ventricular function appears intact.  Chambers do not appear dilated.  There is no evidence of free fluid within the pericardium.    IMPRESSION: Grossly normal left ventricular function and chamber size.  No pericardial effusion..               EKG Interpretation:      Interpreted by Petros Rivero MD  Time reviewed: 1228  Symptoms at time of EKG: back and chest pain   Rhythm: normal sinus   Rate: normal  Axis: normal  Ectopy: none  Conduction: normal  ST Segments/ T Waves: No ST-T wave changes  Q Waves: none  Comparison to prior: No old EKG available    Clinical Impression  nsr without  ischemic changes.          Critical Care time:  none             Labs Ordered and Resulted from Time of ED Arrival Up to the Time of Departure from the ED   COMPREHENSIVE METABOLIC PANEL - Abnormal; Notable for the following components:       Result Value    Glucose 155 (*)     ALT 87 (*)     All other components within normal limits   CBC WITH PLATELETS DIFFERENTIAL   INR   PARTIAL THROMBOPLASTIN TIME   LIPASE   TROPONIN I   NT PROBNP INPATIENT   TROPONIN I   PERIPHERAL IV CATHETER   TROPONIN POCT           Results for orders placed or performed during the hospital encounter of 12/09/19   CBC with platelets differential     Status: None   Result Value Ref Range    WBC 6.6 4.0 - 11.0 10e9/L    RBC Count 4.94 4.4 - 5.9 10e12/L    Hemoglobin 14.8 13.3 - 17.7 g/dL    Hematocrit 43.0 40.0 - 53.0 %    MCV 87 78 - 100 fl    MCH 30.0 26.5 - 33.0 pg    MCHC 34.4 31.5 - 36.5 g/dL    RDW 12.1 10.0 - 15.0 %    Platelet Count 191 150 - 450 10e9/L    Diff Method Automated Method     % Neutrophils 62.7 %    % Lymphocytes 30.5 %    % Monocytes 5.0 %    % Eosinophils 1.1 %    % Basophils 0.5 %    % Immature Granulocytes 0.2 %    Nucleated RBCs 0 0 /100    Absolute Neutrophil 4.2 1.6 - 8.3 10e9/L    Absolute Lymphocytes 2.0 0.8 - 5.3 10e9/L    Absolute Monocytes 0.3 0.0 - 1.3 10e9/L    Absolute Eosinophils 0.1 0.0 - 0.7 10e9/L    Absolute Basophils 0.0 0.0 - 0.2 10e9/L    Abs Immature Granulocytes 0.0 0 - 0.4 10e9/L    Absolute Nucleated RBC 0.0    INR     Status: None   Result Value Ref Range    INR 1.06 0.86 - 1.14   Partial thromboplastin time     Status: None   Result Value Ref Range    PTT 27 22 - 37 sec   Comprehensive metabolic panel     Status: Abnormal   Result Value Ref Range    Sodium 134 133 - 144 mmol/L    Potassium 3.7 3.4 - 5.3 mmol/L    Chloride 98 94 - 109 mmol/L    Carbon Dioxide 26 20 - 32 mmol/L    Anion Gap 10 3 - 14 mmol/L    Glucose 155 (H) 70 - 99 mg/dL    Urea Nitrogen 13 7 - 30 mg/dL    Creatinine 0.83 0.66  - 1.25 mg/dL    GFR Estimate >90 >60 mL/min/[1.73_m2]    GFR Estimate If Black >90 >60 mL/min/[1.73_m2]    Calcium 9.2 8.5 - 10.1 mg/dL    Bilirubin Total 0.5 0.2 - 1.3 mg/dL    Albumin 4.3 3.4 - 5.0 g/dL    Protein Total 8.1 6.8 - 8.8 g/dL    Alkaline Phosphatase 96 40 - 150 U/L    ALT 87 (H) 0 - 70 U/L    AST 40 0 - 45 U/L   Lipase     Status: None   Result Value Ref Range    Lipase 105 73 - 393 U/L   Troponin I     Status: None   Result Value Ref Range    Troponin I ES <0.015 0.000 - 0.045 ug/L   Nt probnp inpatient (BNP)     Status: None   Result Value Ref Range    N-Terminal Pro BNP Inpatient 34 0 - 450 pg/mL   Troponin I     Status: None   Result Value Ref Range    Troponin I ES <0.015 0.000 - 0.045 ug/L   EKG 12 lead     Status: None (Preliminary result)   Result Value Ref Range    Interpretation ECG Click View Image link to view waveform and result    Troponin POCT     Status: None   Result Value Ref Range    Troponin I 0.01 0.00 - 0.08 ug/L         Assessments & Plan (with Medical Decision Making)  44-year-old male with history of type 2 diabetes presented the ER with some initially back pain and then left-sided chest tightness.  Is not pleuritic EKG did not show any ischemic changes no tachycardia.  Initially differential included acute coronary syndrome versus aortic etiology versus PE versus pneumonia infection etiology versus musculoskeletal.  His chest x-ray was normal EKG did not show any ischemic changes troponin x2 was negative.  Labs otherwise stable CT scan with contrast no PE no dissection no infiltrate.  Patient given IV fluids given aspirin nitro did not make any difference with his symptoms patient feels better would like to go home was offered overnight observation to rule out further cardiac causes declines at this point will follow-up with his doctor patient discharged feeling better and will return if any problems at all bedside echo no pericardial effusion or wall motion abnormality          I have reviewed the nursing notes.    I have reviewed the findings, diagnosis, plan and need for follow up with the patient.    Discharge Medication List as of 12/9/2019  4:56 PM          Final diagnoses:   Upper back pain   Left-sided chest pain   Upper respiratory symptom     I, Osmany Foy, am serving as a trained medical scribe to document services personally performed by Petros Rivero MD, based on the provider's statements to me.      I, Petros Rivero MD, was physically present and have reviewed and verified the accuracy of this note documented by Osmany Foy.     12/9/2019   [facility] EMERGENCY DEPARTMENT    This note was created at least in part by the use of dragon voice dictation system. Inadvertent typographical errors may still exist.  Petros Rivero MD.         Petros Rivero MD  12/09/19 [id number]

## 2019-12-09 NOTE — DISCHARGE INSTRUCTIONS
Home.  You understand that we cannot totally exclude cardiac cause of your symptoms but current tests were negative.   You are comfortable going home and decline overnight observation for your symptoms and to further look for heart causes.  Make appointment with your MD for close follow up.  If symptoms change or any concerns, see MD as soon as able.  You can take 2 baby aspirin daily until you see your MD>  Hold your metformin for 2 days and then have your MD recheck creatinine before restarting your metformin.    Results for orders placed or performed during the hospital encounter of 12/09/19   CBC with platelets differential     Status: None   Result Value Ref Range    WBC 6.6 4.0 - 11.0 10e9/L    RBC Count 4.94 4.4 - 5.9 10e12/L    Hemoglobin 14.8 13.3 - 17.7 g/dL    Hematocrit 43.0 40.0 - 53.0 %    MCV 87 78 - 100 fl    MCH 30.0 26.5 - 33.0 pg    MCHC 34.4 31.5 - 36.5 g/dL    RDW 12.1 10.0 - 15.0 %    Platelet Count 191 150 - 450 10e9/L    Diff Method Automated Method     % Neutrophils 62.7 %    % Lymphocytes 30.5 %    % Monocytes 5.0 %    % Eosinophils 1.1 %    % Basophils 0.5 %    % Immature Granulocytes 0.2 %    Nucleated RBCs 0 0 /100    Absolute Neutrophil 4.2 1.6 - 8.3 10e9/L    Absolute Lymphocytes 2.0 0.8 - 5.3 10e9/L    Absolute Monocytes 0.3 0.0 - 1.3 10e9/L    Absolute Eosinophils 0.1 0.0 - 0.7 10e9/L    Absolute Basophils 0.0 0.0 - 0.2 10e9/L    Abs Immature Granulocytes 0.0 0 - 0.4 10e9/L    Absolute Nucleated RBC 0.0    INR     Status: None   Result Value Ref Range    INR 1.06 0.86 - 1.14   Partial thromboplastin time     Status: None   Result Value Ref Range    PTT 27 22 - 37 sec   Comprehensive metabolic panel     Status: Abnormal   Result Value Ref Range    Sodium 134 133 - 144 mmol/L    Potassium 3.7 3.4 - 5.3 mmol/L    Chloride 98 94 - 109 mmol/L    Carbon Dioxide 26 20 - 32 mmol/L    Anion Gap 10 3 - 14 mmol/L    Glucose 155 (H) 70 - 99 mg/dL    Urea Nitrogen 13 7 - 30 mg/dL    Creatinine  0.83 0.66 - 1.25 mg/dL    GFR Estimate >90 >60 mL/min/[1.73_m2]    GFR Estimate If Black >90 >60 mL/min/[1.73_m2]    Calcium 9.2 8.5 - 10.1 mg/dL    Bilirubin Total 0.5 0.2 - 1.3 mg/dL    Albumin 4.3 3.4 - 5.0 g/dL    Protein Total 8.1 6.8 - 8.8 g/dL    Alkaline Phosphatase 96 40 - 150 U/L    ALT 87 (H) 0 - 70 U/L    AST 40 0 - 45 U/L   Lipase     Status: None   Result Value Ref Range    Lipase 105 73 - 393 U/L   Troponin I     Status: None   Result Value Ref Range    Troponin I ES <0.015 0.000 - 0.045 ug/L   Nt probnp inpatient (BNP)     Status: None   Result Value Ref Range    N-Terminal Pro BNP Inpatient 34 0 - 450 pg/mL   Troponin I     Status: None   Result Value Ref Range    Troponin I ES <0.015 0.000 - 0.045 ug/L   EKG 12 lead     Status: None (Preliminary result)   Result Value Ref Range    Interpretation ECG Click View Image link to view waveform and result    Troponin POCT     Status: None   Result Value Ref Range    Troponin I 0.01 0.00 - 0.08 ug/L     CT Chest Pulmonary Embolism w Contrast   Final Result   IMPRESSION:    1. No pulmonary embolism or other acute abnormality is seen in the   chest.   2. Fatty liver.      JULIA GARCIA MD      XR Chest Port 1 View   Final Result   IMPRESSION: No acute cardiopulmonary disease.      JULIA GARCIA MD      POC US ECHO LIMITED    (Results Pending)

## 2019-12-09 NOTE — ED NOTES
Reviewed discharge paperwork with patient. Patient currently denies chest pain, reports back pain is the same but comfortable. Denies shortness of breath. Denies further questions or concerns. VSS. Discharged home.

## 2019-12-09 NOTE — ED NOTES
Ambulated patient around unit. Patient denies shortness of breath, chest pain, lightheaded, or changes in how he is feeling with ambulation. Reports only pain he is currently feeling is in mid/upper back. States he believes it is musculoskeletal as when he stretches he feels the stretch in same place as feels the pain. VSS after ambulation.

## 2019-12-09 NOTE — ED AVS SNAPSHOT
[facility], [place], Emergency Department  [address]  [facility] [address]  Phone:  [phone]  Fax:  [phone]                                    Arturo Mei   MRN: [id number]    Department:  [facility], Emergency Department   Date of Visit:  12/9/2019           After Visit Summary Signature Page    I have received my discharge instructions, and my questions have been answered. I have discussed any challenges I see with this plan with the nurse or doctor.    ..........................................................................................................................................  Patient/Patient Representative Signature      ..........................................................................................................................................  Patient Representative Print Name and Relationship to Patient    ..................................................               ................................................  Date                                   Time    ..........................................................................................................................................  Reviewed by Signature/Title    ...................................................              ..............................................  Date                                               Time          22EPIC Rev 08/18

## 2019-12-10 LAB — INTERPRETATION ECG - MUSE: NORMAL

## 2019-12-26 ENCOUNTER — COMMUNICATION - HEALTHEAST (OUTPATIENT)
Dept: NURSING | Facility: CLINIC | Age: 44
End: 2019-12-26

## 2019-12-26 DIAGNOSIS — E11.9 TYPE 2 DIABETES MELLITUS WITHOUT COMPLICATION, WITHOUT LONG-TERM CURRENT USE OF INSULIN (H): ICD-10-CM

## 2019-12-26 DIAGNOSIS — E11.8 TYPE 2 DIABETES MELLITUS WITH COMPLICATION, WITHOUT LONG-TERM CURRENT USE OF INSULIN (H): ICD-10-CM

## 2020-03-11 ENCOUNTER — AMBULATORY - HEALTHEAST (OUTPATIENT)
Dept: LAB | Facility: CLINIC | Age: 45
End: 2020-03-11

## 2020-03-11 DIAGNOSIS — E78.2 MIXED HYPERLIPIDEMIA: ICD-10-CM

## 2020-03-11 DIAGNOSIS — E11.69 TYPE 2 DIABETES MELLITUS WITH OTHER SPECIFIED COMPLICATION, WITHOUT LONG-TERM CURRENT USE OF INSULIN (H): ICD-10-CM

## 2020-03-11 LAB
ALBUMIN SERPL-MCNC: 4.2 G/DL (ref 3.5–5)
ALP SERPL-CCNC: 89 U/L (ref 45–120)
ALT SERPL W P-5'-P-CCNC: 81 U/L (ref 0–45)
ANION GAP SERPL CALCULATED.3IONS-SCNC: 9 MMOL/L (ref 5–18)
AST SERPL W P-5'-P-CCNC: 53 U/L (ref 0–40)
BILIRUB SERPL-MCNC: 0.5 MG/DL (ref 0–1)
BUN SERPL-MCNC: 13 MG/DL (ref 8–22)
CALCIUM SERPL-MCNC: 9.2 MG/DL (ref 8.5–10.5)
CHLORIDE BLD-SCNC: 101 MMOL/L (ref 98–107)
CO2 SERPL-SCNC: 26 MMOL/L (ref 22–31)
CREAT SERPL-MCNC: 1 MG/DL (ref 0.7–1.3)
CREAT UR-MCNC: 173.4 MG/DL
GFR SERPL CREATININE-BSD FRML MDRD: >60 ML/MIN/1.73M2
GLUCOSE BLD-MCNC: 208 MG/DL (ref 70–125)
HBA1C MFR BLD: 8.4 %
MICROALBUMIN UR-MCNC: 3.28 MG/DL (ref 0–1.99)
MICROALBUMIN/CREAT UR: 18.9 MG/G
POTASSIUM BLD-SCNC: 4.5 MMOL/L (ref 3.5–5)
PROT SERPL-MCNC: 7.1 G/DL (ref 6–8)
SODIUM SERPL-SCNC: 136 MMOL/L (ref 136–145)

## 2020-03-12 ENCOUNTER — COMMUNICATION - HEALTHEAST (OUTPATIENT)
Dept: INTERNAL MEDICINE | Facility: CLINIC | Age: 45
End: 2020-03-12

## 2020-03-16 ENCOUNTER — COMMUNICATION - HEALTHEAST (OUTPATIENT)
Dept: INTERNAL MEDICINE | Facility: CLINIC | Age: 45
End: 2020-03-16

## 2020-03-16 DIAGNOSIS — E11.69 TYPE 2 DIABETES MELLITUS WITH OTHER SPECIFIED COMPLICATION, WITHOUT LONG-TERM CURRENT USE OF INSULIN (H): ICD-10-CM

## 2020-09-28 ENCOUNTER — RECORDS - HEALTHEAST (OUTPATIENT)
Dept: ADMINISTRATIVE | Facility: OTHER | Age: 45
End: 2020-09-28

## 2020-09-28 LAB — RETINOPATHY: NEGATIVE

## 2020-10-02 ENCOUNTER — RECORDS - HEALTHEAST (OUTPATIENT)
Dept: HEALTH INFORMATION MANAGEMENT | Facility: CLINIC | Age: 45
End: 2020-10-02

## 2020-12-06 ENCOUNTER — COMMUNICATION - HEALTHEAST (OUTPATIENT)
Dept: INTERNAL MEDICINE | Facility: CLINIC | Age: 45
End: 2020-12-06

## 2020-12-06 DIAGNOSIS — E11.69 TYPE 2 DIABETES MELLITUS WITH OTHER SPECIFIED COMPLICATION, WITHOUT LONG-TERM CURRENT USE OF INSULIN (H): ICD-10-CM

## 2020-12-14 ENCOUNTER — AMBULATORY - HEALTHEAST (OUTPATIENT)
Dept: LAB | Facility: CLINIC | Age: 45
End: 2020-12-14

## 2020-12-14 DIAGNOSIS — E11.69 TYPE 2 DIABETES MELLITUS WITH OTHER SPECIFIED COMPLICATION, WITHOUT LONG-TERM CURRENT USE OF INSULIN (H): ICD-10-CM

## 2020-12-14 LAB
ALBUMIN SERPL-MCNC: 5 G/DL (ref 3.5–5)
ALP SERPL-CCNC: 99 U/L (ref 45–120)
ALT SERPL W P-5'-P-CCNC: 84 U/L (ref 0–45)
ANION GAP SERPL CALCULATED.3IONS-SCNC: 13 MMOL/L (ref 5–18)
AST SERPL W P-5'-P-CCNC: 49 U/L (ref 0–40)
BASOPHILS # BLD AUTO: 0 THOU/UL (ref 0–0.2)
BASOPHILS NFR BLD AUTO: 1 % (ref 0–2)
BILIRUB SERPL-MCNC: 0.6 MG/DL (ref 0–1)
BUN SERPL-MCNC: 10 MG/DL (ref 8–22)
CALCIUM SERPL-MCNC: 9.5 MG/DL (ref 8.5–10.5)
CHLORIDE BLD-SCNC: 98 MMOL/L (ref 98–107)
CHOLEST SERPL-MCNC: 166 MG/DL
CO2 SERPL-SCNC: 26 MMOL/L (ref 22–31)
CREAT SERPL-MCNC: 0.99 MG/DL (ref 0.7–1.3)
CREAT UR-MCNC: 25.6 MG/DL
EOSINOPHIL # BLD AUTO: 0.1 THOU/UL (ref 0–0.4)
EOSINOPHIL NFR BLD AUTO: 2 % (ref 0–6)
ERYTHROCYTE [DISTWIDTH] IN BLOOD BY AUTOMATED COUNT: 11.6 % (ref 11–14.5)
FASTING STATUS PATIENT QL REPORTED: YES
GFR SERPL CREATININE-BSD FRML MDRD: >60 ML/MIN/1.73M2
GLUCOSE BLD-MCNC: 165 MG/DL (ref 70–125)
HBA1C MFR BLD: 8.5 %
HCT VFR BLD AUTO: 44.5 % (ref 40–54)
HDLC SERPL-MCNC: 46 MG/DL
HGB BLD-MCNC: 15.5 G/DL (ref 14–18)
LDLC SERPL CALC-MCNC: 68 MG/DL
LYMPHOCYTES # BLD AUTO: 2 THOU/UL (ref 0.8–4.4)
LYMPHOCYTES NFR BLD AUTO: 33 % (ref 20–40)
MCH RBC QN AUTO: 30 PG (ref 27–34)
MCHC RBC AUTO-ENTMCNC: 34.7 G/DL (ref 32–36)
MCV RBC AUTO: 87 FL (ref 80–100)
MICROALBUMIN UR-MCNC: 0.56 MG/DL (ref 0–1.99)
MICROALBUMIN/CREAT UR: 21.9 MG/G
MONOCYTES # BLD AUTO: 0.4 THOU/UL (ref 0–0.9)
MONOCYTES NFR BLD AUTO: 6 % (ref 2–10)
NEUTROPHILS # BLD AUTO: 3.4 THOU/UL (ref 2–7.7)
NEUTROPHILS NFR BLD AUTO: 58 % (ref 50–70)
PLATELET # BLD AUTO: 164 THOU/UL (ref 140–440)
PMV BLD AUTO: 7.8 FL (ref 7–10)
POTASSIUM BLD-SCNC: 4.7 MMOL/L (ref 3.5–5)
PROT SERPL-MCNC: 7.7 G/DL (ref 6–8)
RBC # BLD AUTO: 5.15 MILL/UL (ref 4.4–6.2)
SODIUM SERPL-SCNC: 137 MMOL/L (ref 136–145)
TRIGL SERPL-MCNC: 259 MG/DL
WBC: 5.9 THOU/UL (ref 4–11)

## 2020-12-16 ENCOUNTER — OFFICE VISIT - HEALTHEAST (OUTPATIENT)
Dept: INTERNAL MEDICINE | Facility: CLINIC | Age: 45
End: 2020-12-16

## 2020-12-16 DIAGNOSIS — E11.69 TYPE 2 DIABETES MELLITUS WITH OTHER SPECIFIED COMPLICATION, WITHOUT LONG-TERM CURRENT USE OF INSULIN (H): ICD-10-CM

## 2020-12-16 DIAGNOSIS — G47.33 OSA (OBSTRUCTIVE SLEEP APNEA): ICD-10-CM

## 2020-12-16 DIAGNOSIS — E78.2 MIXED HYPERLIPIDEMIA: ICD-10-CM

## 2020-12-16 ASSESSMENT — MIFFLIN-ST. JEOR: SCORE: 2052.92

## 2020-12-19 ENCOUNTER — COMMUNICATION - HEALTHEAST (OUTPATIENT)
Dept: INTERNAL MEDICINE | Facility: CLINIC | Age: 45
End: 2020-12-19

## 2020-12-28 ENCOUNTER — OFFICE VISIT - HEALTHEAST (OUTPATIENT)
Dept: EDUCATION SERVICES | Facility: CLINIC | Age: 45
End: 2020-12-28

## 2020-12-28 DIAGNOSIS — E11.9 TYPE 2 DIABETES MELLITUS (H): ICD-10-CM

## 2021-01-12 ENCOUNTER — COMMUNICATION - HEALTHEAST (OUTPATIENT)
Dept: INTERNAL MEDICINE | Facility: CLINIC | Age: 46
End: 2021-01-12

## 2021-01-12 DIAGNOSIS — Z20.822 SUSPECTED COVID-19 VIRUS INFECTION: ICD-10-CM

## 2021-01-18 ENCOUNTER — OFFICE VISIT - HEALTHEAST (OUTPATIENT)
Dept: EDUCATION SERVICES | Facility: CLINIC | Age: 46
End: 2021-01-18

## 2021-01-18 ENCOUNTER — COMMUNICATION - HEALTHEAST (OUTPATIENT)
Dept: INTERNAL MEDICINE | Facility: CLINIC | Age: 46
End: 2021-01-18

## 2021-01-18 DIAGNOSIS — E11.69 TYPE 2 DIABETES MELLITUS WITH OTHER SPECIFIED COMPLICATION, WITHOUT LONG-TERM CURRENT USE OF INSULIN (H): ICD-10-CM

## 2021-01-18 DIAGNOSIS — L98.9 SKIN LESION: ICD-10-CM

## 2021-01-19 ENCOUNTER — AMBULATORY - HEALTHEAST (OUTPATIENT)
Dept: LAB | Facility: CLINIC | Age: 46
End: 2021-01-19

## 2021-01-19 DIAGNOSIS — Z20.822 SUSPECTED COVID-19 VIRUS INFECTION: ICD-10-CM

## 2021-01-20 LAB — COVID-19 ANTIBODY IGG: NEGATIVE

## 2021-01-22 ENCOUNTER — COMMUNICATION - HEALTHEAST (OUTPATIENT)
Dept: INTERNAL MEDICINE | Facility: CLINIC | Age: 46
End: 2021-01-22

## 2021-01-25 ENCOUNTER — COMMUNICATION - HEALTHEAST (OUTPATIENT)
Dept: INTERNAL MEDICINE | Facility: CLINIC | Age: 46
End: 2021-01-25

## 2021-01-25 DIAGNOSIS — E11.9 TYPE 2 DIABETES MELLITUS WITHOUT COMPLICATION, WITHOUT LONG-TERM CURRENT USE OF INSULIN (H): ICD-10-CM

## 2021-01-25 DIAGNOSIS — E11.8 TYPE 2 DIABETES MELLITUS WITH COMPLICATION, WITHOUT LONG-TERM CURRENT USE OF INSULIN (H): ICD-10-CM

## 2021-02-02 ENCOUNTER — COMMUNICATION - HEALTHEAST (OUTPATIENT)
Dept: EDUCATION SERVICES | Facility: CLINIC | Age: 46
End: 2021-02-02

## 2021-02-23 ENCOUNTER — COMMUNICATION - HEALTHEAST (OUTPATIENT)
Dept: INTERNAL MEDICINE | Facility: CLINIC | Age: 46
End: 2021-02-23

## 2021-02-23 ENCOUNTER — RECORDS - HEALTHEAST (OUTPATIENT)
Dept: ADMINISTRATIVE | Facility: OTHER | Age: 46
End: 2021-02-23

## 2021-03-01 ENCOUNTER — OFFICE VISIT - HEALTHEAST (OUTPATIENT)
Dept: EDUCATION SERVICES | Facility: CLINIC | Age: 46
End: 2021-03-01

## 2021-03-01 DIAGNOSIS — E11.9 TYPE 2 DIABETES MELLITUS (H): ICD-10-CM

## 2021-03-23 ENCOUNTER — AMBULATORY - HEALTHEAST (OUTPATIENT)
Dept: NURSING | Facility: CLINIC | Age: 46
End: 2021-03-23

## 2021-04-08 ENCOUNTER — AMBULATORY - HEALTHEAST (OUTPATIENT)
Dept: LAB | Facility: CLINIC | Age: 46
End: 2021-04-08

## 2021-04-08 DIAGNOSIS — E11.9 TYPE 2 DIABETES MELLITUS (H): ICD-10-CM

## 2021-04-08 LAB — HBA1C MFR BLD: 8.5 %

## 2021-04-12 ENCOUNTER — OFFICE VISIT - HEALTHEAST (OUTPATIENT)
Dept: EDUCATION SERVICES | Facility: CLINIC | Age: 46
End: 2021-04-12

## 2021-04-12 DIAGNOSIS — E11.9 TYPE 2 DIABETES MELLITUS (H): ICD-10-CM

## 2021-04-13 ENCOUNTER — AMBULATORY - HEALTHEAST (OUTPATIENT)
Dept: NURSING | Facility: CLINIC | Age: 46
End: 2021-04-13

## 2021-05-17 ENCOUNTER — OFFICE VISIT - HEALTHEAST (OUTPATIENT)
Dept: EDUCATION SERVICES | Facility: CLINIC | Age: 46
End: 2021-05-17

## 2021-05-17 DIAGNOSIS — E11.69 TYPE 2 DIABETES MELLITUS WITH OTHER SPECIFIED COMPLICATION, WITHOUT LONG-TERM CURRENT USE OF INSULIN (H): ICD-10-CM

## 2021-05-27 NOTE — PATIENT INSTRUCTIONS - HE
1. Discussed healthy diet and exercise.     2. A1C and microalbumin and liver tests today.     3. Continue present medications.     4. Follow up in 6 months.

## 2021-05-27 NOTE — PROGRESS NOTES
ASSESSMENT AND PLAN:    1. Type 2 diabetes mellitus 2  Will monitor his control with metformin, he is on maxiumum dose.  We discussed exercise and diet.  May require GLP-1 agonist therapy if doesn't improve.     - Microalbumin, Random Urine  - Glycosylated Hemoglobin A1c    2. Hepatic steatosis  This is chronic, and was present prior to starting statin therapy.  Will continue to follow.  May be appropriate for US liver in the future if it persists.   - Hepatic Profile    3. Hyperlipidemia  Statin therapy was started 1/2018    This visit was for a total of 25 minutes face to face with the patient. Over 50% of this time was spent in care coordination, counseling and educating the patient about diet, exercise, treatment of hyperlipidemia, and the abnormal LFTs and the meaning of those abnormalities.    Patient Instructions   1. Discussed healthy diet and exercise.     2. A1C and microalbumin and liver tests today.     3. Continue present medications.     4. Follow up in 6 months.      CHIEF COMPLAINT:  Chief Complaint   Patient presents with     Establish Care     HISTORY OF PRESENT ILLNESS:  Arturo Mei is a 43 y.o. male here to establish care. He has had type 2 diabetes for sometime, managed with metformin.  Has had hyperlipidemia, with  or so, with some elevated triglycerides.  There has been chronic LFT abnormalities, mild since before starting statin therapy.  He feels well.  Non-smoker, some alcohol, not excessive.  Exercises some, has young children and is busy with work and home life.  No symptoms of hyperglycemia.     REVIEW OF SYSTEMS:   See HPI, all other systems on review are negative.  Past Medical History:   Diagnosis Date     Diabetes mellitus type 2 in nonobese (H) 2017    Type 2 dx at [facility] 12/2017     Hyperlipidemia      Obstructive sleep apnea      Vitamin D deficiency      Social History     Tobacco Use   Smoking Status Never Smoker   Smokeless Tobacco Never Used     Family History  "  Problem Relation Age of Onset     Hyperlipidemia Father      Multiple sclerosis Mother      Diabetes Mother      Diabetes Sister      Multiple sclerosis Maternal Aunt      Multiple sclerosis Maternal Uncle      Diabetes Paternal Grandmother      Past Surgical History:   Procedure Laterality Date     KNEE ARTHROSCOPY      Description: Arthroscopy Knee;  Recorded: 05/29/2013;  Comments: medial meniscus     SEPTOPLASTY  2007     VITALS:  Vitals:    04/15/19 0830   BP: 110/82   Patient Site: Left Arm   Patient Position: Sitting   Cuff Size: Adult Large   Pulse: 66   SpO2: 98%   Weight: (!) 241 lb 3 oz (109.4 kg)   Height: 6' 2\" (1.88 m)     Wt Readings from Last 3 Encounters:   04/15/19 (!) 241 lb 3 oz (109.4 kg)   10/19/18 (!) 241 lb (109.3 kg)   10/15/18 (!) 241 lb 2 oz (109.4 kg)     PHYSICAL EXAM:  Constitutional:  In NAD, alert and oriented  Heart:  Rhythm is regular.    DECISION TO OBTAIN OLD RECORDS AND/OR OBTAIN HISTORY FROM SOMEONE OTHER THAN PATIENT, AND/OR ACCESSING CARE EVERYWHERE):  1  0    REVIEW AND SUMMARIZATION OF OLD RECORDS, AND/OR OBTAINING HISTORY FROM SOMEONE OTHER THAN PATIENT, AND/OR DISCUSSION OF CASE WITH ANOTHER HEALTH CARE PROVIDER:  2 reviewed 1/18 care evaluation     REVIEW AND/OR ORDER OF OF CLINICAL LAB TESTS: 2  Reviewed LFT's and lipid testing.    REVIEW AND/OR ORDER OF RADIOLOGY TESTS: 1 0    REVIEW AND/OR ORDER OF MEDICAL TESTS (EKG/ECHO/COLONOSCOPY/EGD): 1  0    INDEPENDENT  VISUALIZATION OF IMAGE, TRACING, OR SPECIMEN ITSELF (2 EACH):  0     TOTAL: 4    Current Outpatient Medications   Medication Sig Dispense Refill     aspirin 81 MG EC tablet Take 81 mg by mouth daily.       atorvastatin (LIPITOR) 20 MG tablet Take 1 tablet (20 mg total) by mouth daily. 90 tablet 3     cholecalciferol, vitamin D3, (CHOLECALCIFEROL) 1,000 unit tablet Take 2,000 Units by mouth daily.       DOCOSAHEXANOIC ACID/EPA (FISH OIL ORAL) Take 1 capsule by mouth daily.       metFORMIN (GLUCOPHAGE XR) 500 MG " 24 hr tablet Take 4 tablets (2,000 mg total) by mouth daily with supper. 360 tablet 3     No current facility-administered medications for this visit.      Slick Polk MD  Internal Medicine  [facility]

## 2021-05-31 VITALS — HEIGHT: 74 IN | WEIGHT: 245.38 LBS | BODY MASS INDEX: 31.49 KG/M2

## 2021-05-31 VITALS — WEIGHT: 224 LBS | HEIGHT: 74 IN | BODY MASS INDEX: 28.75 KG/M2

## 2021-06-01 VITALS — WEIGHT: 240.44 LBS | HEIGHT: 74 IN | BODY MASS INDEX: 30.86 KG/M2

## 2021-06-02 VITALS — BODY MASS INDEX: 30.95 KG/M2 | HEIGHT: 74 IN | WEIGHT: 241.19 LBS

## 2021-06-02 VITALS — WEIGHT: 241 LBS | BODY MASS INDEX: 30.94 KG/M2

## 2021-06-02 VITALS — WEIGHT: 241.13 LBS | BODY MASS INDEX: 30.94 KG/M2 | HEIGHT: 74 IN

## 2021-06-03 VITALS
DIASTOLIC BLOOD PRESSURE: 82 MMHG | WEIGHT: 241 LBS | BODY MASS INDEX: 30.93 KG/M2 | OXYGEN SATURATION: 97 % | HEART RATE: 70 BPM | HEIGHT: 74 IN | SYSTOLIC BLOOD PRESSURE: 120 MMHG

## 2021-06-03 NOTE — PATIENT INSTRUCTIONS - HE
1.  Follow up in 4 months.  Reassess diabetes control.  Consider GLP1 agents in the future.      2. We discussed low alcohol and low carbohydrate diet.      3. Will follow LFT's in the future.

## 2021-06-03 NOTE — PROGRESS NOTES
ASSESSMENT AND PLAN:    1. ALFREDO (obstructive sleep apnea)  Ongoing use, he needs to get new supplies, Rx is written.   - CPAP    2. Type 2 diabetes mellitus  Recent A1c 7.5.  His weight is stable.  He feels well.  He reports that he could 'do better' with diet and exercise.  We discussed options about GLP1 agents, and diabetic educator evaluation.  He prefers to see if he can improve.  Will re-assess in 4 months. Consider ACEI or ARB agent in the future as well.      3. Hyperlipidemia  On treatment.  Recent assessment is satisfactory.     4. Abnormal LFT's  He has been aware of hepatic steatosis.  Father had fibrosis.  He does not use excess alcohol.  Declined hepatic US but will follow LFT's in the future, may need fibroscan in the future or GI evaluation as well.     Patient Instructions   1.  Follow up in 4 months.  Reassess diabetes control.  Consider GLP1 agents in the future.      2. We discussed low alcohol and low carbohydrate diet.      3. Will follow LFT's in the future.      CHIEF COMPLAINT:  Chief Complaint   Patient presents with     Diabetes     follow up and mole check     Medication Management     Need CPAP order     HISTORY OF PRESENT ILLNESS:  Arturo Mei is a 44 y.o. male here in follow up.  Recent lab testing was done.  His weight is stable.  He feels he could be doing better with diet and exercise.   No symptoms of illness, or dyspnea, or nausea or bowel or bladder symptoms.  No polyuria or polydipsia.  No unusual cough or fever.  Needs new equipment for CPAP.      REVIEW OF SYSTEMS:   See HPI, all other systems on review are negative.    Past Medical History:   Diagnosis Date     Diabetes mellitus type 2 in nonobese (H) 2017    Type 2 dx at [facility] 12/2017     Hyperlipidemia      Obstructive sleep apnea      Vitamin D deficiency      Social History     Tobacco Use   Smoking Status Never Smoker   Smokeless Tobacco Never Used     Family History   Problem Relation Age of Onset      "Hyperlipidemia Father      Multiple sclerosis Mother      Diabetes Mother      Diabetes Sister      Multiple sclerosis Maternal Aunt      Multiple sclerosis Maternal Uncle      Diabetes Paternal Grandmother      Past Surgical History:   Procedure Laterality Date     KNEE ARTHROSCOPY      Description: Arthroscopy Knee;  Recorded: 05/29/2013;  Comments: medial meniscus     SEPTOPLASTY  2007     VITALS:  Vitals:    11/13/19 0932   BP: 120/82   Patient Site: Left Arm   Patient Position: Sitting   Cuff Size: Adult Regular   Pulse: 70   SpO2: 97%   Weight: (!) 241 lb (109.3 kg)   Height: 6' 2\" (1.88 m)     Wt Readings from Last 3 Encounters:   11/13/19 (!) 241 lb (109.3 kg)   04/15/19 (!) 241 lb 3 oz (109.4 kg)   10/19/18 (!) 241 lb (109.3 kg)     PHYSICAL EXAM:  Constitutional:  In NAD, alert and oriented  Cardiac:  S1 S2  Lungs: Clear    DECISION TO OBTAIN OLD RECORDS AND/OR OBTAIN HISTORY FROM SOMEONE OTHER THAN PATIENT, AND/OR ACCESSING CARE EVERYWHERE):  1  0    REVIEW AND SUMMARIZATION OF OLD RECORDS, AND/OR OBTAINING HISTORY FROM SOMEONE OTHER THAN PATIENT, AND/OR DISCUSSION OF CASE WITH ANOTHER HEALTH CARE PROVIDER:  2 0    REVIEW AND/OR ORDER OF OF CLINICAL LAB TESTS: 1  Recent tests reviewed.     REVIEW AND/OR ORDER OF RADIOLOGY TESTS: 1 0.    REVIEW AND/OR ORDER OF MEDICAL TESTS (EKG/ECHO/COLONOSCOPY/EGD): 1  0    INDEPENDENT  VISUALIZATION OF IMAGE, TRACING, OR SPECIMEN ITSELF (2 EACH):  0     TOTAL: 1    Current Outpatient Medications   Medication Sig Dispense Refill     aspirin 81 MG EC tablet Take 81 mg by mouth daily.       atorvastatin (LIPITOR) 20 MG tablet Take 1 tablet (20 mg total) by mouth daily. 90 tablet 3     cholecalciferol, vitamin D3, (CHOLECALCIFEROL) 1,000 unit tablet Take 2,000 Units by mouth daily.       DOCOSAHEXANOIC ACID/EPA (FISH OIL ORAL) Take 1 capsule by mouth daily.       metFORMIN (GLUCOPHAGE XR) 500 MG 24 hr tablet Take 4 tablets (2,000 mg total) by mouth daily with supper. 360 " tablet 3     Slick Polk MD  Internal Medicine  [facility]

## 2021-06-04 NOTE — TELEPHONE ENCOUNTER
Refills Approved x 2     Rx renewed per Medication Renewal Policy. Medication was last renewed on   Atorvastatin = 10/15/2018 with 3 refills.  Metformin = 1/14/2019 with 3 refills.   Last office visit: 11/13/2019 with PCP Dr CELESTE Alexandre, [facility] Triage/Med Refill 1/2/2020     Requested Prescriptions   Pending Prescriptions Disp Refills     metFORMIN (GLUCOPHAGE-XR) 500 MG 24 hr tablet [Pharmacy Med Name: METFORMIN ER 500MG 24HR TABS] 360 tablet 3     Sig: TAKE FOUR TABLETS BY MOUTH DAILY WITH SUPPER       Metformin Refill Protocol Passed - 12/26/2019 12:47 PM        Passed - Blood pressure in last 12 months     BP Readings from Last 1 Encounters:   11/13/19 120/82             Passed - LFT or AST or ALT in last 12 months     Albumin   Date Value Ref Range Status   11/08/2019 4.2 3.5 - 5.0 g/dL Final     Bilirubin, Total   Date Value Ref Range Status   11/08/2019 0.6 0.0 - 1.0 mg/dL Final     Bilirubin, Direct   Date Value Ref Range Status   04/15/2019 0.2 <=0.5 mg/dL Final     Alkaline Phosphatase   Date Value Ref Range Status   11/08/2019 78 45 - 120 U/L Final     AST   Date Value Ref Range Status   11/08/2019 54 (H) 0 - 40 U/L Final     ALT   Date Value Ref Range Status   11/08/2019 99 (H) 0 - 45 U/L Final     Protein, Total   Date Value Ref Range Status   11/08/2019 6.9 6.0 - 8.0 g/dL Final                Passed - GFR or Serum Creatinine in last 6 months     GFR MDRD Non Af Amer   Date Value Ref Range Status   11/08/2019 >60 >60 mL/min/1.73m2 Final     GFR MDRD Af Amer   Date Value Ref Range Status   11/08/2019 >60 >60 mL/min/1.73m2 Final             Passed - Visit with PCP or prescribing provider visit in last 6 months or next 3 months     Last office visit with prescriber/PCP: Visit date not found OR same dept: Visit date not found OR same specialty: Visit date not found Last physical: Visit date not found Last MTM visit: Visit date not found         Next appt within 3 mo: Visit date not  found  Next physical within 3 mo: Visit date not found  Prescriber OR PCP: Slick Reyes MD  Last diagnosis associated with med order: 1. Type 2 diabetes mellitus with complication, without long-term current use of insulin (H)  - metFORMIN (GLUCOPHAGE-XR) 500 MG 24 hr tablet [Pharmacy Med Name: METFORMIN ER 500MG 24HR TABS]; TAKE FOUR TABLETS BY MOUTH DAILY WITH SUPPER  Dispense: 360 tablet; Refill: 3    2. Type 2 diabetes mellitus without complication, without long-term current use of insulin (H)  - atorvastatin (LIPITOR) 20 MG tablet [Pharmacy Med Name: ATORVASTATIN 20MG TABLETS]; TAKE 1 TABLET(20 MG) BY MOUTH DAILY  Dispense: 90 tablet; Refill: 3     If protocol passes may refill for 12 months if within 3 months of last provider visit (or a total of 15 months).           Passed - A1C in last 6 months     Hemoglobin A1c   Date Value Ref Range Status   11/08/2019 7.5 (H) 3.5 - 6.0 % Final               Passed - Microalbumin in last year      Microalbumin, Random Urine   Date Value Ref Range Status   11/08/2019 3.73 (H) 0.00 - 1.99 mg/dL Final                  atorvastatin (LIPITOR) 20 MG tablet [Pharmacy Med Name: ATORVASTATIN 20MG TABLETS] 90 tablet 3     Sig: TAKE 1 TABLET(20 MG) BY MOUTH DAILY       Statins Refill Protocol (Hmg CoA Reductase Inhibitors) Passed - 12/26/2019 12:47 PM        Passed - PCP or prescribing provider visit in past 12 months      Last office visit with prescriber/PCP: 10/15/2018 Slick Reyes MD OR same dept: Visit date not found OR same specialty: Visit date not found  Last physical: Visit date not found Last MTM visit: Visit date not found   Next visit within 3 mo: Visit date not found  Next physical within 3 mo: Visit date not found  Prescriber OR PCP: Slick Reyes MD  Last diagnosis associated with med order: 1. Type 2 diabetes mellitus with complication, without long-term current use of insulin (H)  - metFORMIN (GLUCOPHAGE-XR) 500 MG 24 hr tablet [Pharmacy Med Name:  METFORMIN ER 500MG 24HR TABS]; TAKE FOUR TABLETS BY MOUTH DAILY WITH SUPPER  Dispense: 360 tablet; Refill: 3    2. Type 2 diabetes mellitus without complication, without long-term current use of insulin (H)  - atorvastatin (LIPITOR) 20 MG tablet [Pharmacy Med Name: ATORVASTATIN 20MG TABLETS]; TAKE 1 TABLET(20 MG) BY MOUTH DAILY  Dispense: 90 tablet; Refill: 3    If protocol passes may refill for 12 months if within 3 months of last provider visit (or a total of 15 months).

## 2021-06-05 VITALS
BODY MASS INDEX: 30.93 KG/M2 | OXYGEN SATURATION: 97 % | WEIGHT: 241 LBS | SYSTOLIC BLOOD PRESSURE: 124 MMHG | DIASTOLIC BLOOD PRESSURE: 88 MMHG | HEIGHT: 74 IN | HEART RATE: 80 BPM

## 2021-06-12 NOTE — PROGRESS NOTES
"OFFICE VISIT    ASSESSMENT/PLAN:   Arturo was seen today for elbow pain for 7 weeks.  Diagnoses and all orders for this visit:    Lateral epicondylitis of right elbow  - Discussed conservative measures with exercise, NSAIDs, elbow brace, massage. Discussed next step options and anticipated time course. Patient states that he will find further resources by himself for cares. Recommended AA.     The assessment, treatment options, and plan was discussed with the patient, who was in agreement. Questions were answered.      SUBJECTIVE: 42 y.o. yo male with history of ALFREDO, HLD, SI joint pain who presents with elbow pain. The right elbow has been tender. Has had tennis elbow in the past (10-15 years ago), which usually resolves after a few weeks. This episode stared when he was putting together [employer] furniture. He had a brace that he got from his dad. Extension of elbow is a little sore. Lifting and twisting elbow makes the pain worst.  He has tried ice, resting, ibuprofen and these have been helpful. Works a desk job mostly. The pain coms and goes. It is burning in nature and can be sharper. Achy and muscle type pains. Pain does not radiate. He fell around that time, but he braced himself with the other arm.     The 10 point ROS is negative except as stated in the subjective.     OBJECTIVE:  /80 (Patient Site: Right Arm, Patient Position: Sitting, Cuff Size: Adult Large)  Pulse 76  Ht 6' 1.75\" (1.873 m)  Wt (!) 224 lb (101.6 kg)  BMI 28.96 kg/m2  Gen: NAD. Alert, oriented. Cooperative.   HEENT: Normocephalic, atraumatic. Pupils equal, sclera clear. No nasal drainage. Moist mucous membranes.   Cardiac: RRR. S1, S2 present. No murmur, rub, gallop.   Extremities: Warm, atraumatic. No edema bilaterally. Right elbow: tenderness to palpation of lateral epicondyle and extensor muscle body. Pain worsens with supination and pronation. Pain is at its worst with resistance when extending the wrist. Flexion is normal. " Sensation and strength is normal. Capillary refill < 3 seconds. Elbow ROM is normal. No overlying skin changes.  Integumentary: No concerning rash or lesions.    Alireza Monroe MD  8/23/2017 4:36 PM

## 2021-06-13 NOTE — PROGRESS NOTES
ASSESSMENT AND PLAN:    1. Type 2 diabetes mellitus with other specified complication, without long-term current use of insulin  Has appointment with diabetic educator.  Will continue metformin. Foot exam today is normal.     2. ALFREDO (obstructive sleep apnea)  Doing well, using CPAP    3. Mixed hyperlipidemia  History of this, on treatment.     4. Hepatic steatosis  LFT's are mild.  Asymptomatic.  Will continue to follow. He does use modest alcohol - 8 drinks per week.  We discussed pros and cons of alcohol use with type 2 DM, and the potential for more severe neuropathy.     5. Possible Covid  URI symptoms early December.  Tested negative twice.  He wants covid antibody test, can get it late January.     Patient Instructions   1. Will get covid antibody in late January.     2. Continue metformin  For now.     3. Follow thru with diabetic educator.     4. Follow up in 6 months.     CHIEF COMPLAINT:  Chief Complaint   Patient presents with     Follow-up     Diabetes     Medication Management     HISTORY OF PRESENT ILLNESS:  Arturo Mei is a 45 y.o. male here in follow up. Recent labs are reviewed.  We discussed that continuing metformin is appropriate.  Has apt with diabetic educator. Likely GLP1 rx would be indicated.  His weight is satisfactory.  Using 8 drinks per week. Non-smoker.  Has had episodic vague feet pain, not stinging pain or otherwise suggestive of neuropathy.     REVIEW OF SYSTEMS:   See HPI, all other systems on review are negative.    Past Medical History:   Diagnosis Date     Diabetes mellitus type 2 in nonobese (H) 2017    Type 2 dx at [place] 12/2017     Hyperlipidemia      Obstructive sleep apnea      Vitamin D deficiency      Social History     Tobacco Use   Smoking Status Never Smoker   Smokeless Tobacco Never Used     Family History   Problem Relation Age of Onset     Hyperlipidemia Father      Multiple sclerosis Mother      Diabetes Mother      Diabetes Sister      Multiple sclerosis  "Maternal Aunt      Multiple sclerosis Maternal Uncle      Diabetes Paternal Grandmother      Past Surgical History:   Procedure Laterality Date     KNEE ARTHROSCOPY      Description: Arthroscopy Knee;  Recorded: 05/29/2013;  Comments: medial meniscus     SEPTOPLASTY  2007     VITALS:  Vitals:    12/16/20 1131   BP: 124/88   Patient Site: Left Arm   Patient Position: Sitting   Cuff Size: Adult Large   Pulse: 80   SpO2: 97%   Weight: (!) 241 lb (109.3 kg)   Height: 6' 2\" (1.88 m)     Wt Readings from Last 3 Encounters:   12/16/20 (!) 241 lb (109.3 kg)   11/13/19 (!) 241 lb (109.3 kg)   04/15/19 (!) 241 lb 3 oz (109.4 kg)     PHYSICAL EXAM:  Constitutional:  In NAD, alert and oriented  Neck: no cervical or axillary adenopathy  Cardiac:  S1 S2   Lungs: Clear  Extremities: bilateral foot exam is negative.  Good distal pulses, no indication or deformity or light touch abnormal sensation. Good arches.     DECISION TO OBTAIN OLD RECORDS AND/OR OBTAIN HISTORY FROM SOMEONE OTHER THAN PATIENT, AND/OR ACCESSING CARE EVERYWHERE):  1  0     REVIEW AND SUMMARIZATION OF OLD RECORDS, AND/OR OBTAINING HISTORY FROM SOMEONE OTHER THAN PATIENT, AND/OR DISCUSSION OF CASE WITH ANOTHER HEALTH CARE PROVIDER:  2 0    REVIEW AND/OR ORDER OF OF CLINICAL LAB TESTS: 1  Reviewed recent lab testing.    REVIEW AND/OR ORDER OF RADIOLOGY TESTS: 1 0.    REVIEW AND/OR ORDER OF MEDICAL TESTS (EKG/ECHO/COLONOSCOPY/EGD): 1  0    INDEPENDENT  VISUALIZATION OF IMAGE, TRACING, OR SPECIMEN ITSELF (2 EACH):  0     TOTAL: 1    Current Outpatient Medications   Medication Sig Dispense Refill     aspirin 81 MG EC tablet Take 81 mg by mouth daily.       atorvastatin (LIPITOR) 20 MG tablet Take 1 tablet (20 mg total) by mouth daily. 90 tablet 3     blood glucose meter (GLUCOMETER) Use 1 each As Directed daily. Dispense glucometer brand per patient's insurance at pharmacy discretion. 100 each 3     blood glucose test (CONTOUR NEXT TEST STRIPS) strips Use 1 each As " Directed daily. Dispense brand per patient's insurance at pharmacy discretion. 100 strip 3     cholecalciferol, vitamin D3, (CHOLECALCIFEROL) 1,000 unit tablet Take 2,000 Units by mouth daily.       DOCOSAHEXANOIC ACID/EPA (FISH OIL ORAL) Take 1 capsule by mouth daily.       generic lancets Use 1 each As Directed daily. Dispense brand per patient's insurance at pharmacy discretion. 100 each 3     metFORMIN (GLUCOPHAGE-XR) 500 MG 24 hr tablet Take 4 tablets (2,000 mg total) by mouth daily with supper. Do Not Crush 360 tablet 3     Slick Polk MD  Internal Medicine  [facility]

## 2021-06-13 NOTE — PATIENT INSTRUCTIONS - HE
1. Will get covid antibody in late January.     2. Continue metformin  For now.     3. Follow thru with diabetic educator.     4. Follow up in 6 months.

## 2021-06-14 NOTE — PROGRESS NOTES
Video      Assessment:  Arturo has had type 2 diabetes for several years but admits not paying much attention to it but communicates his interest in taking better of it and looking for guidance.  He is taking 1000 mg metformin  bid.   He has not been testing BG but does have the supplies.       Plan: Recommnend checking FBS and one other 2 hour post meal reading at varying times.     Reviewed diabetes diagnosis, BG goals, nutrition, activity, and medication guidelines.      1)  check BG as noted above  2) increase activity -  4 days per week   3) Follow carbohydrate recommendations  5)  log food    Follow up in 3 weeks    Subjective and Objective:      Arturo Mei is referred by Dr. Polk for Diabetes Education.     Lab Results   Component Value Date    HGBA1C 8.5 (H) 12/14/2020         Education:     Monitoring   Meter (per above goals): assessment, discussed, pt returned demonstration,- materials mailed  Monitoring: assessment, discussed, pt returned demonstration, literature provided   BG goals: assessment, discussed, pt returned demonstration, literature provided      Nutrition Management:  assessment, discussed, pt returned demo,  literature provided   Weight:assessment, discussed, pt returned demo,  literature provided   Portions/Balance: assessment, discussed, pt returned demo,  literature provided   Carb ID/Count: assessment, discussed, pt returned demo,  literature provided   Label Reading: assessment, discussed, pt returned demo,  literature provided   Heart Healthy Fats:assessment, discussed, pt returned demo,  literature provided   Menu Planning: assessment, discussed, pt returned demo,  literature provided   Dining Out: assessment, discussed,  literature provided   Physical Activity: assessment, discussed,  literature provided   Medications: assessment, discussed  Orals: assessment, discussed  Diabetes Disease Process: Discussed    Acute Complications: Prevent, Detect, Treat:  Hypoglycemia:  Discussed and Literature provided  Hyperglycemia: Discussed and Literature provided  Sick Days: Literature provided    Chronic Complications  Foot Care: literature provided  Skin Care: Literature provided  Eye: Literature provided  ABC: Literature provided  Teeth:Literature provided  Goal Setting and Problem Solving: Discussed  Barriers: Discussed  Psychosocial Adjustments: Discussed      Time spent with the patient: 20 minutes for diabetes education and counseling.   Previous Education: yes  Visit Type:RADHA Barbosa  1/19/2021

## 2021-06-15 NOTE — PROGRESS NOTES
Arturo Mei  1975      Assessment and Plan:  1 new diagnosis type 2 diabetes A1c 7.3 without medication  2 begin atorvastatin/Lipitor for cholesterol  3 he needs to redouble efforts at consistent weight loss exercise and cutting back on alcohol-previously enjoyed craft beers on a regular basis  4 return to clinic 3 months we will continue to monitor his blood tests is not too keen on checking blood sugars currently.  Eventually he would be a candidate for metformin but he wants to hold off on that now.  I had like to see his A1c at least less than 7 and we had a good discussion about that  5 family history of sister with type 1 diabetes and renal failure-patient himself is not a candidate for a kidney donor as noted in the [facility] notes      Chief Complaint: Diabetes type 2 new onset get established (previously [facility] patient)    Visit diagnoses:    1. Type 2 diabetes mellitus without complication, without long-term current use of insulin  Comprehensive Metabolic Panel    Glycosylated Hemoglobin A1c    LDL Cholesterol, Direct    Microalbumin, Random Urine    Urinalysis-UC if Indicated    atorvastatin (LIPITOR) 20 MG tablet   2. Dysmetabolic syndrome     3. Hypertriglyceridemia       Patient Active Problem List   Diagnosis     Hyperlipidemia     ALFREDO (obstructive sleep apnea)     Hypertriglyceridemia     Vitamin D deficiency     Dysmetabolic syndrome     Prediabetes     Past Medical History:   Diagnosis Date     Diabetes mellitus 2017    Type 2 dx at [facility] 12/2017     History of prediabetes 2016     Past Surgical History:   Procedure Laterality Date     WA KNEE SCOPE,DIAGNOSTIC      Description: Arthroscopy Knee;  Recorded: 05/29/2013;  Comments: medial meniscus     SEPTOPLASTY  2007     Social History     Social History     Marital status:      Spouse name: Noni     Number of children: 2     Years of education: N/A     Occupational History      [employer]  "    Social History Main Topics     Smoking status: Former Smoker     Smokeless tobacco: Never Used     Alcohol use 0.0 - 10.0 oz/week     0 - 20 drink(s) per week     Drug use: No     Sexual activity: Yes     Partners: Female      Comment:  2012; son born 2013.     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 Sister (Hellen) with Type I DM since age 16 and CRF- patient (Arturo) dx DM during potential kidney donor work up at [place] (12/2017) Dad- Alexey long term patient at [facility]      Family History   Problem Relation Age of Onset     Hyperlipidemia Father      Multiple sclerosis Mother      Diabetes Mother      Diabetes Sister      Multiple sclerosis Maternal Aunt      Multiple sclerosis Maternal Uncle      Diabetes Paternal Grandmother          Meds:  Current Outpatient Prescriptions   Medication Sig Dispense Refill     cholecalciferol, vitamin D3, (CHOLECALCIFEROL) 1,000 unit tablet Take 1,000 Units by mouth daily.       DOCOSAHEXANOIC ACID/EPA (FISH OIL ORAL) Capsules       atorvastatin (LIPITOR) 20 MG tablet Take 1 tablet (20 mg total) by mouth daily. 30 tablet 11     No current facility-administered medications for this visit.        No Known Allergies    ROS: complete review of symptoms otherwise negative except as noted below    S: Carlyn wants to get established for ongoing primary care with internal medicine.  Unfortunately he had a workup at [facility] as a potential donor for his sister Hellen who has end-stage renal disease and type 1 diabetes since age 16.  Blood and had an A1c of 7.4 and now carries a diagnosis of diabetes.  Previously he was \"prediabetic\" A1c's were always in the low 6 range.  He is overweight and he enjoys craft beer he is already starting to change his lifestyle and his habits.  He " "is not too keen on going on medication for diabetes he does agree to take some atorvastatin for cholesterol.  Not a lot in the way of symptoms.  His father Alexey retired  is a long-term patient here at [facility] and he referred his son Arturo to us.  Electronic health record updated edited and reviewed.  Previously he had been at the [facility]       O:   Vitals:    01/11/18 0942   BP: 128/76   Patient Site: Left Arm   Patient Position: Sitting   Cuff Size: Adult Regular   Pulse: 62   SpO2: 97%   Weight: (!) 245 lb 6 oz (111.3 kg)   Height: 6' 2\" (1.88 m)       Physical Exam:  General-very pleasant polite young man he is overweight  VS- see above  HEENT- neg   Neck- no adenopathy/thyromegaly/bruits  Chest- clear   Cor- reg no murmurs/gallops/ectopics  Extremities: no edema, good distal pulses  Neuro- Cr. NN-  intact, alert,   Abdomen- soft non tender, no masses; no organomegaly      Recent Results (from the past 240 hour(s))   Glycosylated Hemoglobin A1c   Result Value Ref Range    Hemoglobin A1c 7.3 (H) 3.5 - 6.0 %   Urinalysis-UC if Indicated   Result Value Ref Range    Color, UA Yellow Colorless, Yellow, Straw, Light Yellow    Clarity, UA Clear Clear    Glucose,  mg/dL (!) Negative    Bilirubin, UA Negative Negative    Ketones, UA Negative Negative    Specific Gravity, UA 1.025 1.005 - 1.030    Blood, UA Negative Negative    pH, UA 6.0 5.0 - 8.0    Protein, UA Negative Negative mg/dL    Urobilinogen, UA 0.2 E.U./dL 0.2 E.U./dL, 1.0 E.U./dL    Nitrite, UA Negative Negative    Leukocytes, UA Negative Negative         Slick Reyes MD              "

## 2021-06-16 PROBLEM — E11.9 TYPE 2 DIABETES MELLITUS (H): Chronic | Status: ACTIVE | Noted: 2017-12-06

## 2021-06-17 NOTE — PROGRESS NOTES
"Arturo Mei  1975      Assessment and Plan:  1. Diabetes 2/a1c OK at 7.2; wants to hold off on metformin  2. Routine labs today   3. RTC 6 months- focus on wgt loss and exercise      Chief Complaint: dm2    Visit diagnoses:    1. Type 2 diabetes mellitus     2. Hyperlipemia  Lipid Cascade    Comprehensive Metabolic Panel   3. Hyperglycemia  Glycosylated Hemoglobin A1c   4. Vitamin D deficiency  Vitamin D, Total (25-Hydroxy)       Meds:  Current Outpatient Prescriptions   Medication Sig Dispense Refill     atorvastatin (LIPITOR) 20 MG tablet Take 1 tablet (20 mg total) by mouth daily. 30 tablet 11     cholecalciferol, vitamin D3, (CHOLECALCIFEROL) 1,000 unit tablet Take 1,000 Units by mouth daily.       DOCOSAHEXANOIC ACID/EPA (FISH OIL ORAL) Capsules       No current facility-administered medications for this visit.        No Known Allergies    ROS: complete review of symptoms otherwise negative except as noted below    S: Overall doing reasonably well.  The only disappointing aspect as he is unable to exercise on a regular basis and that certainly understandable he is a new job but he might be able to set aside some time a day to exercise is lost 5 pounds but needs to get down further.  Not too keen on taking metformin is due for blood test today.  His all started when he applied to be a kidney transplant donor on behalf of his sister Hellen who has type 1 diabetes and is looking at dialysis.  She is going to get a pancreas kidney transplant when a suitable donor is available.  Otherwise Arturo has been feeling healthy he has been overweight out of shape etc.       O:   Vitals:    04/30/18 0923   BP: 128/80   Patient Site: Left Arm   Patient Position: Sitting   Cuff Size: Adult Regular   Pulse: 70   Resp: 16   SpO2: 98%   Weight: (!) 240 lb 7 oz (109.1 kg)   Height: 6' 2\" (1.88 m)       Physical Exam:  General-  VS- see above          Slick Reyes MD      Recent Results (from the past 240 hour(s)) "   Glycosylated Hemoglobin A1c   Result Value Ref Range    Hemoglobin A1c 7.2 (H) 3.5 - 6.0 %   Lipid Cascade   Result Value Ref Range    Cholesterol 142 <=199 mg/dL    Triglycerides 142 <=149 mg/dL    HDL Cholesterol 43 >=40 mg/dL    LDL Calculated 71 <=129 mg/dL    Patient Fasting > 8hrs? Yes    Comprehensive Metabolic Panel   Result Value Ref Range    Sodium 139 136 - 145 mmol/L    Potassium 5.1 (H) 3.5 - 5.0 mmol/L    Chloride 105 98 - 107 mmol/L    CO2 26 22 - 31 mmol/L    Anion Gap, Calculation 8 5 - 18 mmol/L    Glucose 149 (H) 70 - 125 mg/dL    BUN 13 8 - 22 mg/dL    Creatinine 0.91 0.70 - 1.30 mg/dL    GFR MDRD Af Amer >60 >60 mL/min/1.73m2    GFR MDRD Non Af Amer >60 >60 mL/min/1.73m2    Bilirubin, Total 0.4 0.0 - 1.0 mg/dL    Calcium 9.4 8.5 - 10.5 mg/dL    Protein, Total 7.3 6.0 - 8.0 g/dL    Albumin 4.1 3.5 - 5.0 g/dL    Alkaline Phosphatase 95 45 - 120 U/L    AST 60 (H) 0 - 40 U/L     (H) 0 - 45 U/L

## 2021-06-20 NOTE — LETTER
Letter by Slick Polk MD at      Author: Slick Polk MD Service: -- Author Type: --    Filed:  Encounter Date: 3/12/2020 Status: (Other)         Arturo Mei  [address] [facility] [address]     March 12, 2020     Dear Mr. Mei,    Below are the results from your recent visit:    Resulted Orders   Glycosylated Hemoglobin A1c   Result Value Ref Range    Hemoglobin A1c 8.4 (H) <=5.6 %      Comment:      Prediabetes:   HBA1c       5.7 to 6.4%        Diabetes:        HBA1c        >=6.5%   Patients with Hgb F >5%, total bilirubin >10.0 mg/dL, abnormal red cell turnover, severe renal or hepatic disease or malignancy should not have this A1C method used to diagnose or monitor diabetes.       Comprehensive Metabolic Panel   Result Value Ref Range    Sodium 136 136 - 145 mmol/L    Potassium 4.5 3.5 - 5.0 mmol/L    Chloride 101 98 - 107 mmol/L    CO2 26 22 - 31 mmol/L    Anion Gap, Calculation 9 5 - 18 mmol/L    Glucose 208 (H) 70 - 125 mg/dL    BUN 13 8 - 22 mg/dL    Creatinine 1.00 0.70 - 1.30 mg/dL    GFR MDRD Af Amer >60 >60 mL/min/1.73m2    GFR MDRD Non Af Amer >60 >60 mL/min/1.73m2    Bilirubin, Total 0.5 0.0 - 1.0 mg/dL    Calcium 9.2 8.5 - 10.5 mg/dL    Protein, Total 7.1 6.0 - 8.0 g/dL    Albumin 4.2 3.5 - 5.0 g/dL    Alkaline Phosphatase 89 45 - 120 U/L    AST 53 (H) 0 - 40 U/L    ALT 81 (H) 0 - 45 U/L    Narrative    Fasting Glucose reference range is 70-99 mg/dL per  American Diabetes Association (ADA) guidelines.   Microalbumin, Random Urine   Result Value Ref Range    Microalbumin, Random Urine 3.28 (H) 0.00 - 1.99 mg/dL    Creatinine, Urine 173.4 mg/dL    Microalbumin/Creatinine Ratio Random Urine 18.9 <=19.9 mg/g    Narrative    Microalbumin, Random Urine  <2.0 mg/dL . . . . . . . . Normal  3.0-30.0 mg/dL . . . . . . Microalbuminuria  >30.0 mg/dL . . . . . .  . Clinical Proteinuria    Microalbumin/Creatinine Ratio, Random Urine  <20 mg/g . . . . .. . . . Normal   mg/g . .  . . . . . Microalbuminuria  >300 mg/g . . . . . . . . Clinical Proteinuria           As we discussed at your visit, your diabetes could be better.  Work on the diet and exercise as we discussed and we will reassess on your next visit.     Please call with questions or contact us using [employer].    Sincerely,        Electronically signed by Slick Polk MD

## 2021-06-21 NOTE — PROGRESS NOTES
Arturo Mei  1975      Assessment and Plan:  1 diabetes type 2; suboptimal control with diet alone A1c 7.8-we will refer to pharm D and review options-possible metformin or weekly Trulicity  2 hyperlipidemia continue atorvastatin  3 probable fatty liver with some regular alcohol usage she is cut back will check LFTs today on chemistry profile  4 family history of type 1 diabetes in his sister who just recently received a living unrelated kidney transplant      Chief Complaint: Diabetes and hyperlipidemia and weight    Visit diagnoses:    1. Type 2 diabetes mellitus without complication, without long-term current use of insulin (H)  atorvastatin (LIPITOR) 20 MG tablet    Lipid Cascade    Glycosylated Hemoglobin A1c    Comprehensive Metabolic Panel   2. BMI 31.0-31.9,adult     3. Hyperlipidemia     4. Dysmetabolic syndrome       Patient Active Problem List   Diagnosis     Hyperlipidemia     ALFREDO (obstructive sleep apnea)     Hypertriglyceridemia     Vitamin D deficiency     Dysmetabolic syndrome     Type 2 diabetes mellitus (H)     BMI 31.0-31.9,adult     Past Medical History:   Diagnosis Date     Diabetes mellitus (H) 2017    Type 2 dx at [facility] 12/2017     History of prediabetes 2016     Social History     Social History     Marital status:      Spouse name: Noni     Number of children: 2     Years of education: N/A     Occupational History     /      [employer] legal firm     Social History Main Topics     Smoking status: Never Smoker     Smokeless tobacco: Never Used     Alcohol use 0.0 - 10.0 oz/week     0 - 20 Standard drinks or equivalent per week      Comment: cut back 2017     Drug use: No     Sexual activity: Yes     Partners: Female      Comment:  2012; son born 2013.     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 Sister (Hellen) with Type I DM since age 16 and CRF(renal transplant Aug 2018) ...-(Arturo) dx DM during potential kidney donor work up at [place] (12/2017) Dad- Alexey long term patient at [facility] ; runs plays tennis/             T     Family History   Problem Relation Age of Onset     Hyperlipidemia Father      Multiple sclerosis Mother      Diabetes Mother      Diabetes Sister      Multiple sclerosis Maternal Aunt      Multiple sclerosis Maternal Uncle      Diabetes Paternal Grandmother        Meds:  Current Outpatient Prescriptions   Medication Sig Dispense Refill     atorvastatin (LIPITOR) 20 MG tablet Take 1 tablet (20 mg total) by mouth daily. 90 tablet 3     cholecalciferol, vitamin D3, (CHOLECALCIFEROL) 1,000 unit tablet Take 1,000 Units by mouth daily.       DOCOSAHEXANOIC ACID/EPA (FISH OIL ORAL) Capsules       No current facility-administered medications for this visit.        No Known Allergies    ROS: complete review of symptoms otherwise negative except as noted below    S: He returns today for his recheck he is really not been able to lose much weight he did cut down significantly in the amount of alcohol he drinks he is not been exercising.  His sister just received a living unrelated kidney transplant that is working out nicely she is back at work she never had to be on dialysis.  He seems motivated but is having trouble losing weight he wants to check his labs today he is reluctant to consider medication       O:   Vitals:    10/15/18 1158   BP: 130/76   Patient Site: Left Arm   Patient Position: Sitting   Cuff Size: Adult Large   Pulse: 78   Resp: 16   SpO2: 98%   Weight: (!) 241 lb 2 oz (109.4 kg)   Height: 6' 2\" (1.88 m)       Physical Exam:  General-very pleasant man is overweight  VS- see above  HEENT- neg   Neck- no adenopathy/thyromegaly/bruits  Chest- clear   Cor- reg no murmurs/gallops/ectopics  Extremities: " no edema, good distal pulses  Neuro- Cr. NN-  intact, alert,   Abdomen- soft non tender, no masses; no organomegaly          Slick Reyes MD      Recent Results (from the past 240 hour(s))   Glycosylated Hemoglobin A1c   Result Value Ref Range    Hemoglobin A1c 7.8 (H) 3.5 - 6.0 %

## 2021-06-21 NOTE — PROGRESS NOTES
MTM Initial Encounter  Assessment & Plan                                                     1. Type 2 diabetes mellitus with complication, without long-term current use of insulin (H)  Currently untreated.  Discussed risks versus benefits of adding pharmacologic treatment.  Over the last 2 years or so his blood sugars have continued to increase over time.  First-line therapy for diabetes is metformin.  Discussed low cost, weight neutral if not weight loss benefits of this medication, as well as potential to bring his A1c to goal.  He continues to have difficulty with motivation for weight loss and exercise efforts, however demonstrates understanding of the importance.  Also recommend to initiate aspirin at this time due to uncontrolled diabetes.  No evidence of microalbuminuria and blood pressure is controlled, therefore continue off of ACE inhibitor or ARB.  Continue daily statin.   -Initiate metFORMIN (GLUCOPHAGE XR) 500 MG 24 hr tablet; Take 1 tablet (500 mg total) by mouth daily with supper. Increasing by 500mg weekly for max dose 2000mg daily  Dispense: 360 tablet; Refill: 3  -Initiate aspirin 81 mg daily  - Glycosylated Hemoglobin A1c; Future    2. Hypertriglyceridemia  Stable. Recommended to continue current regimen.     3. Vitamin D deficiency  Stable. Recommended to continue current regimen.     Follow Up  Return in about 3 months (around 1/19/2019) for Labs Only.      Subjective & Objective                                                     Arturo Mei is a 43 y.o. male coming in for an initial visit for Medication Therapy Management. He was referred to me from Slick Reyes MD    Chief Complaint: Diabetes    Medication Adherence/Access: Stable, no issues identified.    Diabetes: Per discussions with PCP, he has been wrestling with going to the gym and eating differently.  He has a very busy job as a  and is very difficult for him to get to the gym either during the day or before or after  work.  He has made an effort to cut back on his drinking and was hoping that this would help his blood sugars however his A1c continues to increase.  To his family members have type 1 diabetes.  He has a several questions relating to medications and prognosis or progression of diabetes as a disease.    Hypercholesterolemia: Tolerating statin without signs or symptoms of myalgias, also on daily fish oil supplement.    Vitamin D deficiency: Historically has had a low vitamin D level, continues on daily vitamin D supplementation.    PMH: reviewed in EPIC   Allergies/ADRs: reviewed in EPIC   Alcohol: reviewed in EPIC   Tobacco:   History   Smoking Status     Never Smoker   Smokeless Tobacco     Never Used     Today's Vitals:   Vitals:    10/19/18 1658   BP: 130/76   Pulse: 78   Weight: (!) 241 lb (109.3 kg)     ----------------    Much or all of the text in this note was generated through the use of Dragon Dictate voice-to-text software. Errors in spelling or words which seem out of context are unintentional. Sound alike errors, in particular, may have escaped editing.    The patient was given a summary of these recommendations as an after visit summary    I spent 30 minutes with this patient today. Out of pocket cost for today's MTM visit was reviewed with the patient. All changes were made via collaborative practice agreement with Slick Reyes MD. A copy of the visit note was provided to the patient's provider.     Karel Sawant, PharmD, BCACP  Medication Management (MTM) Pharmacist  [facility]         Current Outpatient Prescriptions   Medication Sig Dispense Refill     aspirin 81 MG EC tablet Take 81 mg by mouth daily.       atorvastatin (LIPITOR) 20 MG tablet Take 1 tablet (20 mg total) by mouth daily. 90 tablet 3     cholecalciferol, vitamin D3, (CHOLECALCIFEROL) 1,000 unit tablet Take 2,000 Units by mouth daily.       DOCOSAHEXANOIC ACID/EPA (FISH OIL ORAL) Take 1 capsule by mouth daily.        metFORMIN (GLUCOPHAGE XR) 500 MG 24 hr tablet Take 1 tablet (500 mg total) by mouth daily with supper. Increasing by 500mg weekly for max dose 2000mg daily 360 tablet 3     No current facility-administered medications for this visit.

## 2021-06-21 NOTE — LETTER
Letter by Slick Polk MD at      Author: Slick Polk MD Service: -- Author Type: --    Filed:  Encounter Date: 12/19/2020 Status: (Other)         Arturo Mei  [address] [facility] [address]       December 19, 2020     Dear Mr. Mei,    Below are the results from your recent visit:    Resulted Orders   Microalbumin, Random Urine   Result Value Ref Range    Microalbumin, Random Urine 0.56 0.00 - 1.99 mg/dL    Creatinine, Urine 25.6 mg/dL    Microalbumin/Creatinine Ratio Random Urine 21.9 (H) <=19.9 mg/g    Narrative    Microalbumin, Random Urine  <2.0 mg/dL . . . . . . . . Normal  3.0-30.0 mg/dL . . . . . . Microalbuminuria  >30.0 mg/dL . . . . . .  . Clinical Proteinuria    Microalbumin/Creatinine Ratio, Random Urine  <20 mg/g . . . . .. . . . Normal   mg/g . . . . . . . Microalbuminuria  >300 mg/g . . . . . . . . Clinical Proteinuria       Comprehensive Metabolic Panel   Result Value Ref Range    Sodium 137 136 - 145 mmol/L    Potassium 4.7 3.5 - 5.0 mmol/L    Chloride 98 98 - 107 mmol/L    CO2 26 22 - 31 mmol/L    Anion Gap, Calculation 13 5 - 18 mmol/L    Glucose 165 (H) 70 - 125 mg/dL    BUN 10 8 - 22 mg/dL    Creatinine 0.99 0.70 - 1.30 mg/dL    GFR MDRD Af Amer >60 >60 mL/min/1.73m2    GFR MDRD Non Af Amer >60 >60 mL/min/1.73m2    Bilirubin, Total 0.6 0.0 - 1.0 mg/dL    Calcium 9.5 8.5 - 10.5 mg/dL    Protein, Total 7.7 6.0 - 8.0 g/dL    Albumin 5.0 3.5 - 5.0 g/dL    Alkaline Phosphatase 99 45 - 120 U/L    AST 49 (H) 0 - 40 U/L    ALT 84 (H) 0 - 45 U/L    Narrative    Fasting Glucose reference range is 70-99 mg/dL per  American Diabetes Association (ADA) guidelines.   Glycosylated Hemoglobin A1c   Result Value Ref Range    Hemoglobin A1c 8.5 (H) <=5.6 %   Lipid Profile   Result Value Ref Range    Triglycerides 259 (H) <=149 mg/dL    Cholesterol 166 <=199 mg/dL    LDL Calculated 68 <=129 mg/dL    HDL Cholesterol 46 >=40 mg/dL    Patient Fasting > 8hrs? Yes    HM1 (CBC  with Diff)   Result Value Ref Range    WBC 5.9 4.0 - 11.0 thou/uL    RBC 5.15 4.40 - 6.20 mill/uL    Hemoglobin 15.5 14.0 - 18.0 g/dL    Hematocrit 44.5 40.0 - 54.0 %    MCV 87 80 - 100 fL    MCH 30.0 27.0 - 34.0 pg    MCHC 34.7 32.0 - 36.0 g/dL    RDW 11.6 11.0 - 14.5 %    Platelets 164 140 - 440 thou/uL    MPV 7.8 7.0 - 10.0 fL    Neutrophils % 58 50 - 70 %    Lymphocytes % 33 20 - 40 %    Monocytes % 6 2 - 10 %    Eosinophils % 2 0 - 6 %    Basophils % 1 0 - 2 %    Neutrophils Absolute 3.4 2.0 - 7.7 thou/uL    Lymphocytes Absolute 2.0 0.8 - 4.4 thou/uL    Monocytes Absolute 0.4 0.0 - 0.9 thou/uL    Eosinophils Absolute 0.1 0.0 - 0.4 thou/uL    Basophils Absolute 0.0 0.0 - 0.2 thou/uL       Your tests are overall satisfactory. The liver tests remain elevated, mildly, consistent with type 2 diabetes combined with moderate alcohol intake.  The diabetes control could be better, and reduced alcohol intake would be the place to start as well discussed.  We can repeat these tests in 4-6 months.     Please call with questions or contact us using [employer].    Sincerely,        Electronically signed by Slick Polk MD

## 2021-06-30 NOTE — PROGRESS NOTES
"Progress Notes by Kristyn Barbosa RD at 3/1/2021  4:00 PM     Author: Kristyn Barbosa RD Service: -- Author Type: Registered Dietitian    Filed: 3/1/2021  4:58 PM Encounter Date: 3/1/2021 Status: Attested    : Kristyn Barbosa RD (Registered Dietitian) Cosigner: Slick Polk MD at 3/3/2021  9:30 AM    Attestation signed by Slick Polk MD at 3/3/2021  9:30 AM    Slick Polk MD                Type of service:  Video Visit     If the video visit is dropped, the patient would like the video invitation to be resent by: Send to e-mail at: [email or web address]    Originating Location (pt. Location): Home  Distant Location (provider location):  [facility]   Mode of Communication:  Video Conference via [employer]     Video Start Time: 4:00 pm  Video End Time (time video stopped): 4:30 pm     Assessment: Arturo has had type 2 diabetes for a few years.   He is taking 1000 mg metformin bid.  At last CDE appt, it was recommended to start 0.25 mg Ozempic once weekly.  He decided not to start.  He did have 5 lb weight loss and is noticing BG are coming down, so he would like to work harder on the behavioral side of diabetes treatment before starting another medication.  He gets 30-45 minutes 4 days per week aerobic activity and tryng to follow 1271-6772 Kcal per day.   He also comments on his alcohol use;  2-3 glasses wine 3 days per week and his plans to reduce this.         FBS:  140-180 mg/dl     ---     previously;   198, 225, 238, 190, 235, 242, 186  post meal: 150-180 mg/dl      previously:     187, 225, 297, 165, 186     Food recall:   bread, yogurt, raspberries,    L:  salad, vegetables, chicken              dinner:  2 beef enchiladas-             he reports having 2-3 alcoholic beverages several nights per week   He has started reading the \"Diabetes Code\"  which advocates 1) avoid  fructose  2) avoid refined sugar  3) eat only unprocessed " "foods    Plan:  It is a reasonable plan to give it another 3-4 weeks and see how BG are doing.  Due for Alc at that time.  Encouraged him to eliminate alcohol over the next couple of weeks to see the impact on BG and weight loss. To continue to check  FBS and one other 2 hour post meal reading at varying times.   To call with questions concerns.   To f/up video visit in 4-6 weeks.       1)  check BG as noted above  2)  increase activity as able:  5 days per week, 30 minutes per day    3) Follow carbohydrate recommendations  4) start GLP-1 medication-  did not start  (he did  prescription and has it at his home in [facility])           Subjective and Objective:       Arturo Mei is referred by Dr. Polk for Diabetes Education.            Lab Results   Component Value Date     HGBA1C 8.5 (H) 12/14/2020            Education:      Monitoring   Meter (per above goals): assessment, discussed, pt returned demonstration, competent,  using some fo the \"average\" features  Monitoring: assessment, discussed, pt returned demonstration, literature provided   BG goals: assessment, discussed, pt returned demonstration, literature provided        Nutrition Management:  assessment, discussed, pt returned demo,  literature provided   Weight:assessment, discussed, pt returned demo,  literature provided   Portions/Balance: assessment, discussed,   Carb ID/Count: assessment, discussed literature provided  -    Label Reading: assessment, discussed, pt returned demo,  literature provided   Heart Healthy Fats:assessment, discussed, pt returned demo,  literature provided   Menu Planning: assessment, discussed, pt returned demo,  literature provided   Physical Activity: assessment, discussed,  literature provided   Medications: assessment, discussed  Orals: assessment, discussed  Injected Medications: Assessed, Discussed and Literature provided   Storage/Exp:Discussed   Site Rotation: Discussed and Literature provided "      Diabetes Disease Process: Assessed and Discussed     Acute Complications: Prevent, Detect, Treat:  Hypoglycemia: Assessed and Discussed  Hyperglycemia: Discussed  Sick Days: Literature provided     Chronic Complications  Foot Care: literature provided  Skin Care: Literature provided  Eye: Literature provided  ABC: Literature provided  Teeth:Literature provided  Goal Setting and Problem Solving: Discussed  Barriers: Assessed and Discussed  Psychosocial Adjustments: Discussed        Time spent with the patient: 30 minutes for diabetes education and counseling.   Previous Education: yes   Visit Type:DSMT

## 2021-06-30 NOTE — PROGRESS NOTES
Progress Notes by Kristyn Barbosa RD at 1/18/2021  3:00 PM     Author: Kristyn Barbosa RD Service: -- Author Type: Registered Dietitian    Filed: 1/18/2021  5:43 PM Encounter Date: 1/18/2021 Status: Attested    : Kristyn Barbosa RD (Registered Dietitian) Cosigner: Slick Polk MD at 1/18/2021  6:05 PM    Attestation signed by Slick Polk MD at 1/18/2021  6:05 PM    Slick Polk MD                Type of service:  Video Visit    If the video visit is dropped, the patient would like the video invitation to be resent by: Send to e-mail at: [email or web address]    Originating Location (pt. Location): Home  Distant Location (provider location):  [facility]   Mode of Communication:  Video Conference via [employer]    Video Start Time: 3:00 pm  Video End Time (time video stopped): 3:45 pm    Assessment: Arturo has had type 2 diabetes for a few years.   He is taking 1000 mg metformin bid.  He admits he has not taken it very seriously but realizes it is something he needs start taking care of.   He did start to check BG and set goal to log his foods, improve his diet, and be more active.   He is getting some aerobic activity 4 days per week, for 20 minutes, strength training and has logged foods;  typically eating 2300 Kcal daily.      FBS:  198, 225, 238, 190, 235, 242, 186  post meal:  187, 225, 297, 165, 186    Food recall:   bread, yogurt, raspberries,    L:  salad, vegetables, chicken              dinner:  2 beef enchiladas-             he reports having 2-3 alcoholic beverages several nights per week    Plan: Reviewed diabetes diagnosis, BG goals, nutrition, activity, and medication guidelines.   Recommnend checking FBS and one other 2 hour post meal reading at varying times.   From note from Dr. Polk's last visit and following protocol, recommend starting GLP-1 medication.   Will order 0.25 mg Ozempic once weekly for 4 weeks then 0.50 mg once  weekly.  He will check his insurance coverage for Ozempic.  I have mailed diabetes educational materials.   Advised to view video instructions for Ozempic medication.   To call with questions concerns.   To f/up video visit in 4-6 weeks.      1)  check BG as noted above  2)  increase activity as able:  5 days per week, 30 minutes per day    3) Follow carbohydrate recommendations  4) start GLP-1 medication        Subjective and Objective:      Arturo Mei is referred by Dr. Polk for Diabetes Education.     Lab Results   Component Value Date    HGBA1C 8.5 (H) 12/14/2020         Education:     Monitoring   Meter (per above goals): assessment, discussed, pt returned demonstration,  Monitoring: assessment, discussed, pt returned demonstration, literature provided   BG goals: assessment, discussed, pt returned demonstration, literature provided      Nutrition Management:  assessment, discussed, pt returned demo,  literature provided   Weight:assessment, discussed, pt returned demo,  literature provided   Portions/Balance: assessment, discussed, pt returned demo,  literature provided   Carb ID/Count: assessment, discussed, pt returned demo,  literature provided   Label Reading: assessment, discussed, pt returned demo,  literature provided   Heart Healthy Fats:assessment, discussed, pt returned demo,  literature provided   Menu Planning: assessment, discussed, pt returned demo,  literature provided   Dining Out: assessment, discussed,  literature provided   Physical Activity: assessment, discussed,  literature provided   Medications: assessment, discussed  Orals: assessment, discussed  Injected Medications: Assessed, Discussed and Literature provided   Storage/Exp:Discussed   Site Rotation: Discussed and Literature provided     Diabetes Disease Process: Assessed and Discussed    Acute Complications: Prevent, Detect, Treat:  Hypoglycemia: Assessed and Discussed  Hyperglycemia: Discussed  Sick Days: Literature  provided    Chronic Complications  Foot Care: literature provided  Skin Care: Literature provided  Eye: Literature provided  ABC: Literature provided  Teeth:Literature provided  Goal Setting and Problem Solving: Discussed  Barriers: Assessed and Discussed  Psychosocial Adjustments: Discussed      Time spent with the patient: 60 minutes for diabetes education and counseling.   Previous Education: yes by phone  Visit Type:DUSTIN Barbosa  1/18/2021

## 2021-06-30 NOTE — PROGRESS NOTES
Progress Notes by Kristyn Barbosa RD at 5/17/2021  9:30 AM     Author: Kristyn Barbosa RD Service: -- Author Type: Registered Dietitian    Filed: 5/17/2021  1:06 PM Encounter Date: 5/17/2021 Status: Attested    : Kristyn Barbosa RD (Registered Dietitian) Cosigner: Slick Polk MD at 5/17/2021  1:19 PM    Attestation signed by Slick Polk MD at 5/17/2021  1:19 PM    Slick Polk MD                Type of service:  Video Visit     If the video visit is dropped, the patient would like the video invitation to be resent by: Send to e-mail at: [email or web address]    Originating Location (pt. Location): Home  Distant Location (provider location):  [facility]   Mode of Communication:  Video Conference via [employer]     Video Start Time: 9:30 am  Video End Time (time video stopped): 10:00 am     Assessment: Arturo has had type 2 diabetes for a few years.   He is taking 1000 mg metformin bid.  In April, started 0.25 mg Ozempic once weekly;  he has had one dose of 0.5 mg Ozempic- and will take that  once weekly.   He repots it is going better than he thought.   No change in his weight, but has noticed slight decrease in appetite.   He is trying to follow Mediterranean diet, but this has been challenging with young children.   He has not exercised as he would like, busy work schedule and kids have games, etc.   BG slowly improving        FBS:  184 -  7  day average  post meal: 154 - 7 day average           Plan: To continue 0.5 mg Ozempic once weekly.  To continue to check  FBS and one other 2 hour post meal reading at varying times.   To call with questions concerns.   To f/up video visit in 4 weeks.   Alc due in July, will follow up with CDE at that time.         1)  check BG as noted above- complete  2)  increase activity as able:  5 days per week, 30 minutes per day  - sometimes - set this again  3) Follow carbohydrate recommendations -  "sometimes  4) start GLP-1 medication-  complete         Subjective and Objective:       Arturo Mei is referred by Dr. Polk for Diabetes Education.                Lab Results   Component Value Date     HGBA1C 8.5 (H) 12/14/2020                                                                                 8.5%  4/2021     Education:      Monitoring   Meter (per above goals): assessment, discussed, pt returned demonstration, competent,  using some of the \"average\" features  Monitoring: assessment, discussed, pt returned demonstration, literature provided   BG goals: assessment, discussed, pt returned demonstration, literature provided        Nutrition Management:  assessment, discussed, pt returned demo,  literature provided   Weight:assessment, discussed, pt returned demo,  literature provided   Portions/Balance: assessment, discussed,   Carb ID/Count: assessment, discussed literature provided  -    Label Reading: assessment, discussed, pt returned demo,  literature provided   Heart Healthy Fats:assessment, discussed, pt returned demo,  literature provided   Menu Planning: assessment, discussed, pt returned demo,  literature provided   Physical Activity: assessment, discussed,  literature provided   Medications: assessment, discussed  Orals: assessment, discussed  Injected Medications: Assessed, Discussed and Literature provided - asked about storage  Storage/Exp:Discussed   Site Rotation: Discussed and Literature provided      Diabetes Disease Process: Assessed and Discussed     Acute Complications: Prevent, Detect, Treat:  Hypoglycemia: Assessed and Discussed  Hyperglycemia: Discussed  Sick Days: Literature provided     Chronic Complications  Foot Care: literature provided  Skin Care: Literature provided  Eye: Literature provided  ABC: Literature provided  Teeth:Literature provided  Goal Setting and Problem Solving: Discussed  Barriers: Assessed and Discussed  Psychosocial " Adjustments: Discussed        Time spent with the patient: 30 minutes for diabetes education and counseling.   Previous Education: yes   Visit Type:DSMT

## 2021-06-30 NOTE — PROGRESS NOTES
Progress Notes by Kristyn Barbosa RD at 4/12/2021  1:00 PM     Author: Kristyn Barbosa RD Service: -- Author Type: Registered Dietitian    Filed: 4/12/2021  1:39 PM Encounter Date: 4/12/2021 Status: Attested    : Kristyn Barbosa RD (Registered Dietitian) Cosigner: Slick Polk MD at 4/12/2021  2:36 PM    Attestation signed by Slick Polk MD at 4/12/2021  2:36 PM    Slick Polk MD                Type of service:  Video Visit     If the video visit is dropped, the patient would like the video invitation to be resent by: Send to e-mail at: [email or web address]    Originating Location (pt. Location): Home  Distant Location (provider location):  [facility]   Mode of Communication:  Video Conference via [employer]     Video Start Time: 1:00 pm  Video End Time (time video stopped): 1:30 pm     Assessment: Arturo has had type 2 diabetes for a few years.   He is taking 1000 mg metformin bid.  At CDE visit 1/18/21, it was recommended to start 0.25 mg Ozempic once weekly.  He decided not to start and wanted to continue to work on making lifestyle changes.   He had recent Alc at 8.5% and is communicates his willingness to start 0.25 Ozempic once weekly.  He had initial 5 lb weight loss, but weight is now stable.  He is asking about following 1500 Kcal meal plan.   He usually gets 30-45 minutes 4 days per week aerobic activity and tryng to follow 1800 Kcal per day.   He also comments on his alcohol use;  2-3 glasses wine 3 days per week and his plans to reduce this.         FBS:  190-200 mg/dl  post meal: 160-180 mg/dl          Plan: To start 0.25 Ozempic once weekly, he will start on Thursday this week because he is having Covid vaccine on Tuesday and doesn't want any confusion about side effects.   Reviewed how to take Ozempic. To continue to check  FBS and one other 2 hour post meal reading at varying times.   To call with questions concerns.   To f/up  "video visit in 4 weeks.  Likely recommend 0.5 mg Ozempic once weekly at that time.       1)  check BG as noted above- complete  2)  increase activity as able:  5 days per week, 30 minutes per day  - most days  3) Follow carbohydrate recommendations  4) start GLP-1 medication-  did not start  (he did  prescription and has it at his home in [facility])- set this goal again today  4/12/21           Subjective and Objective:       Arturo Mei is referred by Dr. Polk for Diabetes Education.                Lab Results   Component Value Date     HGBA1C 8.5 (H) 12/14/2020                                                                                 8.5%  4/2021     Education:      Monitoring   Meter (per above goals): assessment, discussed, pt returned demonstration, competent,  using some fo the \"average\" features  Monitoring: assessment, discussed, pt returned demonstration, literature provided   BG goals: assessment, discussed, pt returned demonstration, literature provided        Nutrition Management:  assessment, discussed, pt returned demo,  literature provided   Weight:assessment, discussed, pt returned demo,  literature provided   Portions/Balance: assessment, discussed,   Carb ID/Count: assessment, discussed literature provided  -    Label Reading: assessment, discussed, pt returned demo,  literature provided   Heart Healthy Fats:assessment, discussed, pt returned demo,  literature provided   Menu Planning: assessment, discussed, pt returned demo,  literature provided   Physical Activity: assessment, discussed,  literature provided   Medications: assessment, discussed  Orals: assessment, discussed  Injected Medications: Assessed, Discussed and Literature provided   Storage/Exp:Discussed   Site Rotation: Discussed and Literature provided      Diabetes Disease Process: Assessed and Discussed     Acute Complications: Prevent, Detect, Treat:  Hypoglycemia: Assessed and " Discussed  Hyperglycemia: Discussed  Sick Days: Literature provided     Chronic Complications  Foot Care: literature provided  Skin Care: Literature provided  Eye: Literature provided  ABC: Literature provided  Teeth:Literature provided  Goal Setting and Problem Solving: Discussed  Barriers: Assessed and Discussed  Psychosocial Adjustments: Discussed        Time spent with the patient: 30 minutes for diabetes education and counseling.   Previous Education: yes   Visit Type:DSMT

## 2021-07-03 NOTE — ADDENDUM NOTE
Addendum Note by Jaylan Alexander MD at 1/20/2021  9:11 AM     Author: Jaylan Alexander MD Service: -- Author Type: Physician    Filed: 1/20/2021  9:11 AM Encounter Date: 1/18/2021 Status: Signed    : Jaylan Alexander MD (Physician)    Addended by: JAYLAN ALEXANDER on: 1/20/2021 09:11 AM        Modules accepted: Orders

## 2021-07-03 NOTE — ADDENDUM NOTE
Addendum Note by Jaylan Reyes MD at 1/12/2018  8:59 AM     Author: Jaylan Reyes MD Service: -- Author Type: Physician    Filed: 1/12/2018  8:59 AM Encounter Date: 1/11/2018 Status: Signed    : Jaylan Reyes MD (Physician)    Addended by: JAYLAN REYES on: 1/12/2018 08:59 AM        Modules accepted: Orders

## 2021-07-20 DIAGNOSIS — E11.9 TYPE 2 DIABETES MELLITUS WITHOUT COMPLICATION, WITHOUT LONG-TERM CURRENT USE OF INSULIN (H): Primary | ICD-10-CM

## 2021-07-21 ENCOUNTER — LAB (OUTPATIENT)
Dept: LAB | Facility: CLINIC | Age: 46
End: 2021-07-21
Payer: COMMERCIAL

## 2021-07-21 DIAGNOSIS — E11.69 TYPE 2 DIABETES MELLITUS WITH OTHER SPECIFIED COMPLICATION, WITHOUT LONG-TERM CURRENT USE OF INSULIN (H): ICD-10-CM

## 2021-07-21 LAB — HBA1C MFR BLD: 7.1 % (ref 0–5.6)

## 2021-07-21 PROCEDURE — 83036 HEMOGLOBIN GLYCOSYLATED A1C: CPT

## 2021-07-21 PROCEDURE — 36415 COLL VENOUS BLD VENIPUNCTURE: CPT

## 2021-07-21 RX ORDER — SEMAGLUTIDE 1.34 MG/ML
0.25 INJECTION, SOLUTION SUBCUTANEOUS
Qty: 2 ML | Refills: 3 | Status: SHIPPED | OUTPATIENT
Start: 2021-07-21 | End: 2023-06-20

## 2021-07-24 ENCOUNTER — HEALTH MAINTENANCE LETTER (OUTPATIENT)
Age: 46
End: 2021-07-24

## 2021-07-26 ENCOUNTER — VIRTUAL VISIT (OUTPATIENT)
Dept: EDUCATION SERVICES | Facility: CLINIC | Age: 46
End: 2021-07-26
Payer: COMMERCIAL

## 2021-07-26 DIAGNOSIS — E11.9 TYPE 2 DIABETES MELLITUS (H): Primary | ICD-10-CM

## 2021-07-26 PROCEDURE — 98967 PH1 ASSMT&MGMT NQHP 11-20: CPT | Mod: 95 | Performed by: DIETITIAN, REGISTERED

## 2021-07-26 NOTE — LETTER
"    7/26/2021         RE: Arturo Mei  [address] [place] [address]        Dear Colleague,    Thank you for referring your patient, Arturo Mei, to the [facility]. Please see a copy of my visit note below.    Type of Service: Telephone Visit    Assessment: Arturo has had type 2 diabetes for a few years.   He is taking 1000 mg metformin bid and  0.5 mg Ozempic once weekly.   No change in his weight, but has noticed slight decrease in appetite.   He is trying to follow Mediterranean diet, but this has been challenging with young children.   He has not exercised as he would like, busy work schedule and kids have games, etc.   BG slowly improving  .  Recent A1C  7.1%      FBS:  140-160 mg/dl  post meal:  160-180 's            Plan: To continue current meds.  o continue to check  FBS and one other 2 hour post meal reading at varying times.   To call with questions concerns.  Rec is next Alc in October is not at goal yet, will rec basal insulin to address FBS elevations.   PCP follow up October.         1)  check BG as noted above- complete  2)  increase activity as able:  5 days per week, 30 minutes per day  - sometimes - set this again  3) Follow carbohydrate recommendations - sometimes  4) start GLP-1 medication-  complete         Subjective and Objective:       Arturo Mei is referred by Dr. Polk for Diabetes Education.                Lab Results   Component Value Date     HGBA1C 8.5 (H) 12/14/2020                                                                                 8.5%  4/2021    7.1%  7/21/21     Education:      Monitoring   Meter (per above goals): assessment, discussed, pt returned demonstration, competent,  using some of the \"average\" features  Monitoring: assessment, discussed, pt returned demonstration, literature provided   BG goals: assessment, discussed, pt returned demonstration, literature provided        Nutrition " Management:  assessment, discussed, pt returned demo,  literature provided   Weight:assessment, discussed, pt returned demo,  literature provided   Portions/Balance: assessment, discussed,   Carb ID/Count: assessment, discussed literature provided  -    Label Reading: assessment, discussed, pt returned demo,  literature provided   Heart Healthy Fats:assessment, discussed, pt returned demo,  literature provided   Menu Planning: assessment, discussed, pt returned demo,  literature provided   Physical Activity: assessment, discussed,  literature provided   Medications: assessment, discussed  Orals: assessment, discussed  Injected Medications: Assessed, Discussed and Literature provided - asked about storage  Storage/Exp:Discussed   Site Rotation: Discussed and Literature provided   Telephone visit     Diabetes Disease Process: Assessed and Discussed     Acute Complications: Prevent, Detect, Treat:  Hypoglycemia: Assessed and Discussed  Hyperglycemia: Discussed  Sick Days: Literature provided     Chronic Complications  Foot Care: literature provided  Skin Care: Literature provided  Eye: Literature provided  ABC: Literature provided  Teeth:Literature provided  Goal Setting and Problem Solving: Discussed  Barriers: Assessed and Discussed  Psychosocial Adjustments: Discussed        Time spent with the patient: 15 minutes phone for diabetes education and counseling.   Previous Education: yes   Visit Type: phone

## 2021-10-11 DIAGNOSIS — E11.9 TYPE 2 DIABETES, HBA1C GOAL < 8% (H): ICD-10-CM

## 2021-10-11 DIAGNOSIS — E78.5 HYPERLIPIDEMIA LDL GOAL <100: Primary | ICD-10-CM

## 2021-11-01 ENCOUNTER — MYC MEDICAL ADVICE (OUTPATIENT)
Dept: EDUCATION SERVICES | Facility: CLINIC | Age: 46
End: 2021-11-01

## 2021-11-01 DIAGNOSIS — E11.9 DIABETES MELLITUS, TYPE 2 (H): Primary | ICD-10-CM

## 2021-11-05 ENCOUNTER — LAB (OUTPATIENT)
Dept: LAB | Facility: CLINIC | Age: 46
End: 2021-11-05
Payer: COMMERCIAL

## 2021-11-05 DIAGNOSIS — E78.5 HYPERLIPIDEMIA LDL GOAL <100: ICD-10-CM

## 2021-11-05 DIAGNOSIS — E11.9 TYPE 2 DIABETES, HBA1C GOAL < 8% (H): ICD-10-CM

## 2021-11-05 LAB
ALBUMIN SERPL-MCNC: 4.3 G/DL (ref 3.5–5)
ALP SERPL-CCNC: 80 U/L (ref 45–120)
ALT SERPL W P-5'-P-CCNC: 91 U/L (ref 0–45)
ANION GAP SERPL CALCULATED.3IONS-SCNC: 9 MMOL/L (ref 5–18)
AST SERPL W P-5'-P-CCNC: 45 U/L (ref 0–40)
BILIRUB SERPL-MCNC: 0.7 MG/DL (ref 0–1)
BUN SERPL-MCNC: 11 MG/DL (ref 8–22)
CALCIUM SERPL-MCNC: 9.7 MG/DL (ref 8.5–10.5)
CHLORIDE BLD-SCNC: 100 MMOL/L (ref 98–107)
CHOLEST SERPL-MCNC: 139 MG/DL
CO2 SERPL-SCNC: 27 MMOL/L (ref 22–31)
CREAT SERPL-MCNC: 1.09 MG/DL (ref 0.7–1.3)
ERYTHROCYTE [DISTWIDTH] IN BLOOD BY AUTOMATED COUNT: 11.8 % (ref 10–15)
FASTING STATUS PATIENT QL REPORTED: YES
GFR SERPL CREATININE-BSD FRML MDRD: 82 ML/MIN/1.73M2
GLUCOSE BLD-MCNC: 175 MG/DL (ref 70–125)
HBA1C MFR BLD: 7.3 % (ref 0–5.6)
HCT VFR BLD AUTO: 45.8 % (ref 40–53)
HDLC SERPL-MCNC: 40 MG/DL
HGB BLD-MCNC: 15.5 G/DL (ref 13.3–17.7)
LDLC SERPL CALC-MCNC: 58 MG/DL
MCH RBC QN AUTO: 29 PG (ref 26.5–33)
MCHC RBC AUTO-ENTMCNC: 33.8 G/DL (ref 31.5–36.5)
MCV RBC AUTO: 86 FL (ref 78–100)
PLATELET # BLD AUTO: 181 10E3/UL (ref 150–450)
POTASSIUM BLD-SCNC: 4.9 MMOL/L (ref 3.5–5)
PROT SERPL-MCNC: 7.4 G/DL (ref 6–8)
RBC # BLD AUTO: 5.35 10E6/UL (ref 4.4–5.9)
SODIUM SERPL-SCNC: 136 MMOL/L (ref 136–145)
TRIGL SERPL-MCNC: 207 MG/DL
WBC # BLD AUTO: 5 10E3/UL (ref 4–11)

## 2021-11-05 PROCEDURE — 80053 COMPREHEN METABOLIC PANEL: CPT

## 2021-11-05 PROCEDURE — 80061 LIPID PANEL: CPT

## 2021-11-05 PROCEDURE — 36415 COLL VENOUS BLD VENIPUNCTURE: CPT

## 2021-11-05 PROCEDURE — 83036 HEMOGLOBIN GLYCOSYLATED A1C: CPT

## 2021-11-05 PROCEDURE — 85027 COMPLETE CBC AUTOMATED: CPT

## 2021-11-08 DIAGNOSIS — E11.9 TYPE 2 DIABETES, HBA1C GOAL < 8% (H): Primary | ICD-10-CM

## 2021-11-10 ENCOUNTER — OFFICE VISIT (OUTPATIENT)
Dept: INTERNAL MEDICINE | Facility: CLINIC | Age: 46
End: 2021-11-10
Payer: COMMERCIAL

## 2021-11-10 VITALS
BODY MASS INDEX: 31.3 KG/M2 | DIASTOLIC BLOOD PRESSURE: 78 MMHG | HEART RATE: 85 BPM | OXYGEN SATURATION: 97 % | WEIGHT: 243.8 LBS | SYSTOLIC BLOOD PRESSURE: 122 MMHG

## 2021-11-10 DIAGNOSIS — E55.9 VITAMIN D DEFICIENCY: ICD-10-CM

## 2021-11-10 DIAGNOSIS — G47.33 OSA (OBSTRUCTIVE SLEEP APNEA): Primary | ICD-10-CM

## 2021-11-10 DIAGNOSIS — Z11.4 SCREENING FOR HIV (HUMAN IMMUNODEFICIENCY VIRUS): ICD-10-CM

## 2021-11-10 DIAGNOSIS — K76.0 HEPATIC STEATOSIS: ICD-10-CM

## 2021-11-10 PROCEDURE — 99396 PREV VISIT EST AGE 40-64: CPT | Performed by: INTERNAL MEDICINE

## 2021-11-10 NOTE — PROGRESS NOTES
Answers for HPI/ROS submitted by the patient on 11/6/2021  Frequency of exercise:: 1 day/week  Getting at least 3 servings of Calcium per day:: Yes  Diet:: Diabetic  Taking medications regularly:: Yes  Medication side effects:: None  Bi-annual eye exam:: Yes  Dental care twice a year:: Yes  Sleep apnea or symptoms of sleep apnea:: Sleep apnea  Additional concerns today:: No  Duration of exercise:: 30-45 minutes    SUBJECTIVE:   CC: Arturo Mei is an 46 year old male who presents for preventative health visit.     HPI;  He feels well overall.  No unusual dyspnea or cough, is treating his ALFREDO with CPAP.  Non-smoker, working, raising his children. No reports of symptoms suggestive of hyperglycemia.   Recent lab testing is noted.  A1c is 7.3.  Using ozempic and metformin.  Ozempic costs are high.      Patient has been advised of split billing requirements and indicates understanding: Yes  Healthy Habits:     Getting at least 3 servings of Calcium per day:  Yes    Bi-annual eye exam:  Yes    Dental care twice a year:  Yes    Sleep apnea or symptoms of sleep apnea:  Sleep apnea    Diet:  Diabetic    Frequency of exercise:  1 day/week    Duration of exercise:  30-45 minutes    Taking medications regularly:  Yes    Medication side effects:  None    PHQ-2 Total Score: 0    Additional concerns today:  No    Today's PHQ-2 Score:   PHQ-2 ( 1999 [employer]) 11/6/2021   Q1: Little interest or pleasure in doing things 0   Q2: Feeling down, depressed or hopeless 0   PHQ-2 Score 0   Q1: Little interest or pleasure in doing things Not at all   Q2: Feeling down, depressed or hopeless Not at all   PHQ-2 Score 0     Abuse: Current or Past(Physical, Sexual or Emotional) no  Do you feel safe in your environment? yes    Have you ever done Advance Care Planning? no     Social History     Tobacco Use     Smoking status: Never Smoker     Smokeless tobacco: Never Used   Substance Use Topics     Alcohol use: Yes     Alcohol/week: 0.0 - 16.7  standard drinks     Comment: Alcoholic Drinks/day: cut back 2017     Alcohol Use 11/6/2021   Prescreen: >3 drinks/day or >7 drinks/week? No     Reviewed and updated as needed this visit by clinical staff  Tobacco  Allergies  Meds   Med Hx  Surg Hx  Fam Hx  Soc Hx     Reviewed and updated as needed this visit by Provider     Past Medical History:   Diagnosis Date     Diabetes mellitus type 2 in nonobese (H) 2017    Type 2 dx at [place] 12/2017     Hyperlipidemia      Obstructive sleep apnea      Vitamin D deficiency       Past Surgical History:   Procedure Laterality Date     ARTHROSCOPY KNEE      Description: Arthroscopy Knee;  Recorded: 05/29/2013;  Comments: medial meniscus     SEPTOPLASTY  2007     Review of Systems  See HPI    OBJECTIVE:     PHYSICAL EXAM:  General Appearance: In no acute distress  /78 (BP Location: Left arm, Patient Position: Sitting, Cuff Size: Adult Large)   Pulse 85   Wt 110.6 kg (243 lb 12.8 oz)   SpO2 97%   BMI 31.30 kg/m    NECK:  without cervical or axillary adenopathy  RESPIRATORY: Clear   CARDIOVASCULAR: S1, S2    ASSESSMENT/PLAN:     (G47.33) ALFREDO (obstructive sleep apnea)  (primary encounter diagnosis)  Comment: ongoing treatment with CPAP      (E11.9) Type 2 diabetes mellitus   Comment: we discussed that he can inquire from his health insurance about lower cost alternatives.  Perhaps oral ozempic.  Possibly adding empaglifozin.      (E55.9) Vitamin D deficiency  Comment: we discussed ongoing replacement with 1000 I. U. Daily D3.  He says he has been taking 5000 daily which is a bit high.      (K76.0) Hepatic steatosis  Comment: with abnormal LFT's.  Father has this with fibrosis. Will get fiberscan.   Plan: US Elastography Only    COUNSELING:   Reviewed preventive health counseling, as reflected in patient instructions       Regular exercise       Healthy diet/nutrition       Aspirin prophylaxis     Estimated body mass index is 30.94 kg/m  as calculated from the  "following:    Height as of 12/16/20: 1.88 m (6' 2\").    Weight as of 12/16/20: 109.3 kg (241 lb).     Weight management plan: Discussed healthy diet and exercise guidelines    He reports that he has never smoked. He has never used smokeless tobacco.    Slick Polk MD  [facility]  "

## 2021-11-19 ENCOUNTER — ANCILLARY PROCEDURE (OUTPATIENT)
Dept: ULTRASOUND IMAGING | Facility: CLINIC | Age: 46
End: 2021-11-19
Attending: INTERNAL MEDICINE
Payer: COMMERCIAL

## 2021-11-19 DIAGNOSIS — K76.0 HEPATIC STEATOSIS: ICD-10-CM

## 2021-11-19 PROCEDURE — 91200 LIVER ELASTOGRAPHY: CPT | Mod: GC | Performed by: RADIOLOGY

## 2021-12-06 ENCOUNTER — TRANSFERRED RECORDS (OUTPATIENT)
Dept: HEALTH INFORMATION MANAGEMENT | Facility: CLINIC | Age: 46
End: 2021-12-06
Payer: COMMERCIAL

## 2021-12-06 LAB — RETINOPATHY: NEGATIVE

## 2021-12-07 DIAGNOSIS — K76.0 HEPATIC STEATOSIS: Primary | ICD-10-CM

## 2021-12-08 DIAGNOSIS — K76.0 HEPATIC STEATOSIS: Primary | ICD-10-CM

## 2021-12-09 ENCOUNTER — LAB (OUTPATIENT)
Dept: LAB | Facility: CLINIC | Age: 46
End: 2021-12-09
Payer: COMMERCIAL

## 2021-12-09 DIAGNOSIS — K76.0 HEPATIC STEATOSIS: ICD-10-CM

## 2021-12-09 LAB
ALBUMIN SERPL-MCNC: 4.5 G/DL (ref 3.5–5)
ALP SERPL-CCNC: 86 U/L (ref 45–120)
ALT SERPL W P-5'-P-CCNC: 79 U/L (ref 0–45)
ANION GAP SERPL CALCULATED.3IONS-SCNC: 7 MMOL/L (ref 5–18)
AST SERPL W P-5'-P-CCNC: 43 U/L (ref 0–40)
BILIRUB DIRECT SERPL-MCNC: 0.2 MG/DL
BILIRUB SERPL-MCNC: 0.5 MG/DL (ref 0–1)
BUN SERPL-MCNC: 11 MG/DL (ref 8–22)
CALCIUM SERPL-MCNC: 10.2 MG/DL (ref 8.5–10.5)
CHLORIDE BLD-SCNC: 101 MMOL/L (ref 98–107)
CO2 SERPL-SCNC: 30 MMOL/L (ref 22–31)
CREAT SERPL-MCNC: 1.11 MG/DL (ref 0.7–1.3)
ERYTHROCYTE [DISTWIDTH] IN BLOOD BY AUTOMATED COUNT: 12 % (ref 10–15)
GFR SERPL CREATININE-BSD FRML MDRD: 80 ML/MIN/1.73M2
GLUCOSE BLD-MCNC: 202 MG/DL (ref 70–125)
HCT VFR BLD AUTO: 44.9 % (ref 40–53)
HGB BLD-MCNC: 15.1 G/DL (ref 13.3–17.7)
INR PPP: 0.98 (ref 0.85–1.15)
MCH RBC QN AUTO: 28.5 PG (ref 26.5–33)
MCHC RBC AUTO-ENTMCNC: 33.6 G/DL (ref 31.5–36.5)
MCV RBC AUTO: 85 FL (ref 78–100)
PLATELET # BLD AUTO: 185 10E3/UL (ref 150–450)
POTASSIUM BLD-SCNC: 4.7 MMOL/L (ref 3.5–5)
PROT SERPL-MCNC: 7 G/DL (ref 6–8)
RBC # BLD AUTO: 5.3 10E6/UL (ref 4.4–5.9)
SODIUM SERPL-SCNC: 138 MMOL/L (ref 136–145)
WBC # BLD AUTO: 5.5 10E3/UL (ref 4–11)

## 2021-12-09 PROCEDURE — 36415 COLL VENOUS BLD VENIPUNCTURE: CPT

## 2021-12-09 PROCEDURE — 80053 COMPREHEN METABOLIC PANEL: CPT

## 2021-12-09 PROCEDURE — 82248 BILIRUBIN DIRECT: CPT

## 2021-12-09 PROCEDURE — 85610 PROTHROMBIN TIME: CPT

## 2021-12-09 PROCEDURE — 85027 COMPLETE CBC AUTOMATED: CPT

## 2021-12-09 NOTE — TELEPHONE ENCOUNTER
RECORDS RECEIVED FROM: Internal   Appt Date: 12.14.2021   NOTES STATUS DETAILS   OFFICE NOTE from referring provider Internal 12.07.2021 Slick Polk MD   OFFICE NOTES from other specialists N/A    DISCHARGE SUMMARY from hospital N/A    MEDICATION LIST Internal    LIVER BIOSPY (IF APPLICABLE)      PATHOLOGY REPORTS  N/A    IMAGING     ENDOSCOPY (IF AVAILABLE) N/A    COLONOSCOPY (IF AVAILABLE) N/A    ULTRASOUND LIVER Internal 11.19.2021 Liver Ultrasound Elastography   CT OF ABDOMEN N/A    MRI OF LIVER N/A    FIBROSCAN, US ELASTOGRAPHY, FIBROSIS SCAN, MR ELASTOGRAPHY N/A    LABS     HEPATIC PANEL (LIVER PANEL) Internal 04.15.2019   BASIC METABOLIC PANEL N/A    COMPLETE METABOLIC PANEL Internal 11.05.2021   COMPLETE BLOOD COUNT (CBC) Internal 11.05.2021   INTERNATIONAL NORMALIZED RATIO (INR) Internal 12.09.2019   HEPATITIS C ANTIBODY Internal 01.11.2018   HEPATITIS C VIRAL LOAD/PCR N/A    HEPATITIS C GENOTYPE N/A    HEPATITIS B SURFACE ANTIGEN Internal 01.11.2018   HEPATITIS B SURFACE ANTIBODY N/A    HEPATITIS B DNA QUANT LEVEL N/A    HEPATITIS B CORE ANTIBODY N/A

## 2021-12-13 DIAGNOSIS — F43.29 ADJUSTMENT DISORDER WITH MIXED EMOTIONAL FEATURES: Primary | ICD-10-CM

## 2021-12-14 ENCOUNTER — PRE VISIT (OUTPATIENT)
Dept: GASTROENTEROLOGY | Facility: CLINIC | Age: 46
End: 2021-12-14
Payer: COMMERCIAL

## 2021-12-14 ENCOUNTER — VIRTUAL VISIT (OUTPATIENT)
Dept: GASTROENTEROLOGY | Facility: CLINIC | Age: 46
End: 2021-12-14
Attending: INTERNAL MEDICINE
Payer: COMMERCIAL

## 2021-12-14 DIAGNOSIS — K76.0 HEPATIC STEATOSIS: ICD-10-CM

## 2021-12-14 PROCEDURE — 99204 OFFICE O/P NEW MOD 45 MIN: CPT | Mod: 95 | Performed by: INTERNAL MEDICINE

## 2021-12-14 ASSESSMENT — PAIN SCALES - GENERAL: PAINLEVEL: NO PAIN (0)

## 2021-12-14 NOTE — PROGRESS NOTES
REASON FOR CONSULTATION: WHITFIELD   REFERRING PROVIDER: lSick Polk MD    A/P  Arturo Mei is a 46 Y M with NAFLD.  RF: BMI 31, DM, HLD. DM control is improving with ozempic  F1-F2 fibrosis on elastography.    Discussed fatty liver, natural hx, rec for control of BP, DM, lipids, diet (eliminating sugar and flour) and weight loss 5-10%. Ok to be on a statin. In fact, evidence of benefit in WHITFIELD. Metformin and other meds for DM also ok.    Will ensure completion of other liver disease evaluation (HBsAby, HBcoreABy, iron panel)    RTC in 1-2 years.    Romy Colunga MD  Hepatology/Liver Transplant  Medical Director, Liver Transplantation  [facility]  Subjective  Arturo Mei is a 46 Y M referred for WHITFIELD.    About 2 years ago there were some elevations in his transaminases. Fibrosis scan showed F1-F2. Father has WHITFIELD F3.  HCV neg  HBV neg sAg  Iron panel no record  Fibrosis assessment US elastography 11/19/21 F1-F2    Risk factors for fatty liver disease: DM A1C 7.3, BMI 31 (weight was 220 in early adulthood, now 230-250), HLD   ETOH use: drinks about 3 nights/wk. 3-5 Beer or wine       Lab Test 12/09/21  1549   PROTTOTAL 7.0   ALBUMIN 4.5   BILITOTAL 0.5   ALKPHOS 86   AST 43*   ALT 79*     Lab Test 12/09/21  1549   WBC 5.5   RBC 5.30   HGB 15.1   HCT 44.9   MCV 85   MCH 28.5   MCHC 33.6   RDW 12.0            Past Medical History:   Diagnosis Date     Diabetes mellitus type 2 in nonobese (H) 01/01/2017    Type 2 dx at [place] 12/2017     Hepatic steatosis      Hyperlipidemia      Obstructive sleep apnea      Vitamin D deficiency        Social History     Socioeconomic History     Marital status:      Spouse name: Not on file     Number of children: 2     Years of education: Not on file     Highest education level: Not on file   Occupational History     Not on file   Tobacco Use     Smoking status: Never Smoker     Smokeless tobacco: Never Used   Substance and Sexual Activity      Alcohol use: Yes     Alcohol/week: 0.0 - 16.7 standard drinks     Comment: Alcoholic Drinks/day: cut back 2017     Drug use: No     Sexual activity: Yes     Partners: Female     Comment:  2012; son born 2013.   Other Topics Concern     Not on file   Social History Narrative     Noni; 2 boys (4.5 and 1.5 Ian/Agustin)-2018; works as Legal  [employer] ; went to [facility] then [employer]; [employer]- HS; +etoh craft beer but cut back with dx of DM; non smoker. 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  Exam  Gen Alert pleasant NAD  Resp No difficulty breathing. No cough  Skin No Jaundice  Eyes No icterus  Neuro FELDER  MSK no muscle wasting  Psyche Pleasant, appropriate. Well groomed.    Arturo Mei is a 46 year old male who is being evaluated via a billable video visit.    Video-Visit Details  Type of service:  Video Visit  Video Start Time: 836  Video End Time:910  Originating Location (pt. Location):home  Distant Location  (provider location):  [facility]PATOLOG[facility] [place]      Platform used for Video Visit: Wiztango or LogicLoopsuzie

## 2021-12-14 NOTE — LETTER
12/14/2021     RE: Arturo Mei  [address] [place] [address]    Dear Colleague,    Thank you for referring your patient, Arturo Mei, to the [facility] [place]. Please see a copy of my visit note below.    REASON FOR CONSULTATION: WHITFIELD   REFERRING PROVIDER: Slick Polk MD    A/P  Arturo Mei is a 46 Y M with NAFLD.  RF: BMI 31, DM, HLD. DM control is improving with ozempic  F1-F2 fibrosis on elastography.    Discussed fatty liver, natural hx, rec for control of BP, DM, lipids, diet (eliminating sugar and flour) and weight loss 5-10%. Ok to be on a statin. In fact, evidence of benefit in WHITFIELD. Metformin and other meds for DM also ok.    Will ensure completion of other liver disease evaluation (HBsAby, HBcoreABy, iron panel)    RTC in 1-2 years.    Romy Colunga MD  Hepatology/Liver Transplant  Medical Director, Liver Transplantation  [facility]  Subjective  Arturo Mei is a 46 Y M referred for WHITFIELD.    About 2 years ago there were some elevations in his transaminases. Fibrosis scan showed F1-F2. Father has WHITFIELD F3.  HCV neg  HBV neg sAg  Iron panel no record  Fibrosis assessment US elastography 11/19/21 F1-F2    Risk factors for fatty liver disease: DM A1C 7.3, BMI 31 (weight was 220 in early adulthood, now 230-250), HLD   ETOH use: drinks about 3 nights/wk. 3-5 Beer or wine       Lab Test 12/09/21  1549   PROTTOTAL 7.0   ALBUMIN 4.5   BILITOTAL 0.5   ALKPHOS 86   AST 43*   ALT 79*     Lab Test 12/09/21  1549   WBC 5.5   RBC 5.30   HGB 15.1   HCT 44.9   MCV 85   MCH 28.5   MCHC 33.6   RDW 12.0            Past Medical History:   Diagnosis Date     Diabetes mellitus type 2 in nonobese (H) 01/01/2017    Type 2 dx at [place] 12/2017     Hepatic steatosis      Hyperlipidemia      Obstructive sleep apnea      Vitamin D deficiency        Social History     Socioeconomic History     Marital status:      Spouse  name: Not on file     Number of children: 2     Years of education: Not on file     Highest education level: Not on file   Occupational History     Not on file   Tobacco Use     Smoking status: Never Smoker     Smokeless tobacco: Never Used   Substance and Sexual Activity     Alcohol use: Yes     Alcohol/week: 0.0 - 16.7 standard drinks     Comment: Alcoholic Drinks/day: cut back 2017     Drug use: No     Sexual activity: Yes     Partners: Female     Comment:  2012; son born 2013.   Other Topics Concern     Not on file   Social History Narrative     Noni; 2 boys (4.5 and 1.5 Ian/Agustin)-2018; works as Legal [employer] ; went to [employer] then [employer]; [employer]- HS; +etoh craft beer but cut back with dx of DM; non smoker. 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  Exam  Gen Alert pleasant NAD  Resp No difficulty breathing. No cough  Skin No Jaundice  Eyes No icterus  Neuro FELDER  MSK no muscle wasting  Psyche Pleasant,  appropriate. Well groomed.    Arturo Mei is a 46 year old male who is being evaluated via a billable video visit.    Video-Visit Details  Type of service:  Video Visit  Video Start Time: 836  Video End Time:910  Originating Location (pt. Location):home  Distant Location (provider location):  [facility]PAT[facility] [place]      Platform used for Video Visit: Feedzai or Tripwire          Again, thank you for allowing me to participate in the care of your patient.      Sincerely,    Romy Colunga MD

## 2021-12-14 NOTE — PROGRESS NOTES
"Arturo is a 46 year old who is being evaluated via a billable video visit.      How would you like to obtain your AVS? MyChart  If the video visit is dropped, the invitation should be resent by: Text to cell phone: [phone]  Will anyone else be joining your video visit? No  {If patient encounters technical issues they should call [phone] :[id number]}    Video Start Time: {video visit start/end time for provider to select:152948}  Video-Visit Details    Type of service:  Video Visit    Video End Time:{video visit start/end time for provider to select:152948}    Originating Location (pt. Location): {video visit patient location:272248::\"Home\"}    Distant Location (provider location):  [facility]PATOLOG[facility] [place]     Platform used for Video Visit: {Virtual Visit Platforms:655377::\"Eoscene\"}    "

## 2021-12-14 NOTE — PROGRESS NOTES
Left voicemail for patient to call back to set up telemedicine visit, will call again before appointment time.  Corrina Sutton CMA on 12/14/2021 at 8:10 AM

## 2021-12-16 NOTE — PROGRESS NOTES
"Type of Service: Telephone Visit    Assessment: Arturo has had type 2 diabetes for a few years.   He is taking 1000 mg metformin bid and  0.5 mg Ozempic once weekly.   No change in his weight, but has noticed slight decrease in appetite.   He is trying to follow Mediterranean diet, but this has been challenging with young children.   He has not exercised as he would like, busy work schedule and kids have games, etc.   BG slowly improving  .  Recent A1C  7.1%      FBS:  140-160 mg/dl  post meal:  160-180 's            Plan: To continue current meds.  o continue to check  FBS and one other 2 hour post meal reading at varying times.   To call with questions concerns.  Rec is next Alc in October is not at goal yet, will rec basal insulin to address FBS elevations.   PCP follow up October.         1)  check BG as noted above- complete  2)  increase activity as able:  5 days per week, 30 minutes per day  - sometimes - set this again  3) Follow carbohydrate recommendations - sometimes  4) start GLP-1 medication-  complete         Subjective and Objective:       Arturo Mei is referred by Dr. Polk for Diabetes Education.                Lab Results   Component Value Date     HGBA1C 8.5 (H) 12/14/2020                                                                                 8.5%  4/2021    7.1%  7/21/21     Education:      Monitoring   Meter (per above goals): assessment, discussed, pt returned demonstration, competent,  using some of the \"average\" features  Monitoring: assessment, discussed, pt returned demonstration, literature provided   BG goals: assessment, discussed, pt returned demonstration, literature provided        Nutrition Management:  assessment, discussed, pt returned demo,  literature provided   Weight:assessment, discussed, pt returned demo,  literature provided   Portions/Balance: assessment, discussed,   Carb ID/Count: assessment, discussed literature provided  -    Label Reading: " 3/15/21: GREAT RESPONSE. COMPLETE 3 INJ. WILL ASSESS TX PRN NEXT VISIT. assessment, discussed, pt returned demo,  literature provided   Heart Healthy Fats:assessment, discussed, pt returned demo,  literature provided   Menu Planning: assessment, discussed, pt returned demo,  literature provided   Physical Activity: assessment, discussed,  literature provided   Medications: assessment, discussed  Orals: assessment, discussed  Injected Medications: Assessed, Discussed and Literature provided - asked about storage  Storage/Exp:Discussed   Site Rotation: Discussed and Literature provided   Telephone visit     Diabetes Disease Process: Assessed and Discussed     Acute Complications: Prevent, Detect, Treat:  Hypoglycemia: Assessed and Discussed  Hyperglycemia: Discussed  Sick Days: Literature provided     Chronic Complications  Foot Care: literature provided  Skin Care: Literature provided  Eye: Literature provided  ABC: Literature provided  Teeth:Literature provided  Goal Setting and Problem Solving: Discussed  Barriers: Assessed and Discussed  Psychosocial Adjustments: Discussed        Time spent with the patient: 15 minutes phone for diabetes education and counseling.   Previous Education: yes   Visit Type: phone

## 2022-03-14 ENCOUNTER — TELEPHONE (OUTPATIENT)
Dept: INTERNAL MEDICINE | Facility: CLINIC | Age: 47
End: 2022-03-14
Payer: COMMERCIAL

## 2022-03-14 DIAGNOSIS — E11.9 TYPE 2 DIABETES, HBA1C GOAL < 8% (H): ICD-10-CM

## 2022-03-14 NOTE — TELEPHONE ENCOUNTER
Reason for Call:  Other call back    Detailed comments: Pt stating he had an option to increase qanitity of:  Metformin - pt takes 4 tabs per day  Currently he is gettin    Would like to increase to:  270 - this would be a 3 month apply    Pharm:  [facility] [address]    Please advise      Phone Number Patient can be reached at: Cell number on file:    Telephone Information:   Mobile [phone]       Best Time: anytime    Can we leave a detailed message on this number? YES    Call taken on 3/14/2022 at 9:21 AM by Sari Jimenez

## 2022-03-15 RX ORDER — METFORMIN HCL 500 MG
2000 TABLET, EXTENDED RELEASE 24 HR ORAL DAILY
Qty: 360 TABLET | Refills: 0 | Status: SHIPPED | OUTPATIENT
Start: 2022-03-15 | End: 2022-08-06

## 2022-03-21 ENCOUNTER — TELEPHONE (OUTPATIENT)
Dept: BEHAVIORAL HEALTH | Facility: CLINIC | Age: 47
End: 2022-03-21
Payer: COMMERCIAL

## 2022-03-21 NOTE — TELEPHONE ENCOUNTER
Left a VM to remind pt about their Wedesday 3/23/22 video appt at 8 am     Writer mentioned, pt will need to connect virtual/video appt via My Chart and finish e-check in question 15-30 min prior to appt time and to also fill/complete the General Consent.

## 2022-03-23 ENCOUNTER — VIRTUAL VISIT (OUTPATIENT)
Dept: PSYCHOLOGY | Facility: CLINIC | Age: 47
End: 2022-03-23
Payer: COMMERCIAL

## 2022-03-23 DIAGNOSIS — F43.23 ADJUSTMENT DISORDER WITH MIXED ANXIETY AND DEPRESSED MOOD: Primary | ICD-10-CM

## 2022-03-23 PROCEDURE — 90834 PSYTX W PT 45 MINUTES: CPT | Mod: 95 | Performed by: PSYCHOLOGIST

## 2022-03-23 ASSESSMENT — COLUMBIA-SUICIDE SEVERITY RATING SCALE - C-SSRS
1. HAVE YOU WISHED YOU WERE DEAD OR WISHED YOU COULD GO TO SLEEP AND NOT WAKE UP?: NO
TOTAL  NUMBER OF ABORTED OR SELF INTERRUPTED ATTEMPTS LIFETIME: NO
6. HAVE YOU EVER DONE ANYTHING, STARTED TO DO ANYTHING, OR PREPARED TO DO ANYTHING TO END YOUR LIFE?: NO
ATTEMPT LIFETIME: NO
TOTAL  NUMBER OF INTERRUPTED ATTEMPTS LIFETIME: NO
2. HAVE YOU ACTUALLY HAD ANY THOUGHTS OF KILLING YOURSELF?: NO

## 2022-03-23 NOTE — PROGRESS NOTES
[facility]   Mental Health & Addiction Services     Progress Note - Initial Visit    Patient  Name:  Arturo Mei Date: 3/23/22         Service Type: Individual     Visit Start Time: 8:00 AM  Visit End Time: 8:52 AM    Visit #: 1    Attendees: Client    Service Modality:  Video Visit:      Provider verified identity through the following two step process.  Patient provided:  Patient photo    Telemedicine Visit: The patient's condition can be safely assessed and treated via synchronous audio and visual telemedicine encounter.      Reason for Telemedicine Visit: Services only offered telehealth    Originating Site (Patient Location): Patient's home    Distant Site (Provider Location): Provider Remote Setting- Home Office    Consent:  The patient/guardian has verbally consented to: the potential risks and benefits of telemedicine (video visit) versus in person care; bill my insurance or make self-payment for services provided; and responsibility for payment of non-covered services.     Patient would like the video invitation sent by:  Text to cell phone: [phone]    Mode of Communication:  Video Conference via [employer]    As the provider I attest to compliance with applicable laws and regulations related to telemedicine.       DATA:   Interactive Complexity: No   Crisis: No     Presenting Concerns/  Current Stressors:   Individual/Family/Societal  Patient was on time and present for the session.  Patient presented to the session with concerns related to anger.  Expressed relational conflict due to anger with family.  They want to be proactive and have healthy relationships with wife and kids.  The writer of this note and patient continued to gather pertinent background information necessary to complete a clinical assessment      ASSESSMENT:  Mental Status Assessment:  Appearance:   Appropriate   Eye Contact:   Good   Psychomotor Behavior: Normal   Attitude:   Cooperative    Orientation:   All  Speech   Rate / Production: Normal/ Responsive   Volume:  Normal   Mood:    Normal  Affect:    Appropriate   Thought Content:  Clear   Thought Form:  Coherent  Logical   Insight:    Fair       Safety Issues and Plan for Safety and Risk Management:     Nash Suicide Severity Rating Scale (Lifetime/Recent)  Nash Suicide Severity Rating (Lifetime/Recent) 3/23/2022   1. Wish to be Dead (Lifetime) 0   2. Non-Specific Active Suicidal Thoughts (Lifetime) 0   Actual Attempt (Lifetime) 0   Has subject engaged in non-suicidal self-injurious behavior? (Lifetime) 0   Interrupted Attempts (Lifetime) 0   Aborted or Self-Interrupted Attempt (Lifetime) 0   Preparatory Acts or Behavior (Lifetime) 0   Calculated C-SSRS Risk Score (Lifetime/Recent) No Risk Indicated     Patient denies current fears or concerns for personal safety.  Patient denies current or recent suicidal ideation or behaviors.  Patient denies current or recent homicidal ideation or behaviors.  Patient denies current or recent self injurious behavior or ideation.  Patient denies other safety concerns.  Recommended that patient call 911 or go to the local ED should there be a change in any of these risk factors.  Patient reports there are no firearms in the house.     Diagnostic Criteria:  Adjustment Disorder  A. The development of emotional or behavioral symptoms in response to an identifiable stressor(s) occurring within 3 months of the onset of the stressor(s)  B. These symptoms or behaviors are clinically significant, as evidenced by one or both of the following:       - Marked distress that is out of proportion to the severity/intensity of the stressor (with consideration for external context & culture)  C. The stress-related disturbance does not meet criteria for another disorder & is not not an exacerbation of another mental disorder  D. The symptoms do not represent normal bereavement  E. Once the stressor or its consequences have  terminated, the symptoms do not persist for more than an additional 6 months       * Adjustment Disorder with Mixed Anxiety and Depressed Mood: The predominant manifestation is a combination of depression and anxiety      DSM5 Diagnoses: (Sustained by DSM5 Criteria Listed Above)  Diagnoses: Adjustment Disorders  309.28 (F43.23) With mixed anxiety and depressed mood  Psychosocial & Contextual Factors: Individual/Family/Relational/Societal2  WHODAS 2.0 (12 item):   WHODAS 2.0 Total Score 3/23/2022   Total Score 12   Total Score MyChart 12     Intervention:   Psychodynamic- Patient processed internal experiences   Collateral Reports Completed:  Not Applicable      PLAN: (Homework, other):  1. Provider will continue Diagnostic Assessment.  Patient was given the following to do until next session:  No homework assigned at this time    2. Provider recommended the following referrals: No other referrals indicated at this time.      3.  Suicide Risk and Safety Concerns were assessed for Arturo Mei.    Patient meets the following risk assessment and triage: Patient denied any current/recent/lifetime history of suicidal ideation and/or behaviors.  No safety plan indicated at this time.       [facility]landon [facility]  March 23, 2022

## 2022-04-12 ENCOUNTER — TELEPHONE (OUTPATIENT)
Dept: BEHAVIORAL HEALTH | Facility: CLINIC | Age: 47
End: 2022-04-12
Payer: COMMERCIAL

## 2022-04-12 NOTE — TELEPHONE ENCOUNTER
Left a VM to remind pt about their Thursday 4/14/22 video appt at 8 am.     Writer mentioned, pt will need to connect virtual/video appt via My Chart and finish e-check in question 15-30 min prior to appt time.

## 2022-04-14 ENCOUNTER — VIRTUAL VISIT (OUTPATIENT)
Dept: PSYCHOLOGY | Facility: CLINIC | Age: 47
End: 2022-04-14
Payer: COMMERCIAL

## 2022-04-14 DIAGNOSIS — Z03.89 NO DIAGNOSIS ON AXIS I: Primary | ICD-10-CM

## 2022-04-14 PROCEDURE — 90791 PSYCH DIAGNOSTIC EVALUATION: CPT | Mod: 95 | Performed by: PSYCHOLOGIST

## 2022-04-20 NOTE — PROGRESS NOTES
"    [facility] Counseling  Provider Name:  Jamie Blandon     Credentials:  [facility]    PATIENT'S NAME: Arturo Mei  PREFERRED NAME: Arturo  PRONOUNS:       MRN: [id number]  : 1975  ADDRESS: [address] [place] [address]  ACCT. NUMBER:  [id number]  DATE OF SERVICE: 22  START TIME: 8:00 AM  END TIME: 8:52 AM  PREFERRED PHONE: [phone]  May we leave a program related message: Yes  SERVICE MODALITY:  Video Visit:      Provider verified identity through the following two step process.  Patient provided:  Patient photo    Telemedicine Visit: The patient's condition can be safely assessed and treated via synchronous audio and visual telemedicine encounter.      Reason for Telemedicine Visit: Services only offered telehealth    Originating Site (Patient Location): Patient's home    Distant Site (Provider Location): Provider Remote Setting- Home Office    Consent:  The patient/guardian has verbally consented to: the potential risks and benefits of telemedicine (video visit) versus in person care; bill my insurance or make self-payment for services provided; and responsibility for payment of non-covered services.     Patient would like the video invitation sent by:  Text to cell phone: [phone]    Mode of Communication:  Video Conference via [email or web address]    As the provider I attest to compliance with applicable laws and regulations related to telemedicine.    UNIVERSAL ADULT Mental Health DIAGNOSTIC ASSESSMENT    Identifying Information:  Patient is a 46 year old, .  The pronoun use throughout this assessment reflects the patient's chosen pronoun.  Patient was referred for an assessment by primary care clinic.  Patient attended the session alone.    Chief Complaint:   The reason for seeking services at this time is: \"Anger Issues\".  The problem(s) began 22.    Patient has not attempted to resolve these concerns in the past.    Social/Family History:  Patient reported they " "grew up in [place].  They were raised by biological parents.  Parents were always together. Patient reports father worked a lot and mother was around more present but also worked a lot. Patient reports that they have an older sister and a younger brother. Patient reported that their childhood was \"pretty good, normal\".  Patient discussed that growing up as a child, they had a positive and normal developmental experience.  They discussed doing well socially and academically growing up. Patient described their current relationships with family of origin as \"pretty close\" Patient discussed having some adverse family conflicts/experiences that has strengthened their relationships.      The patient describes their cultural background as .  Cultural influences and impact on patient's life structure, values, norms, and healthcare: None.  Contextual influences on patient's health include: Family Factors Relational conflicts due to outbursts at times. These factors will be addressed in the Preliminary Treatment plan. Patient identified their preferred language to be English. Patient reported they does not need the assistance of an  or other support involved in therapy.     Patient reported had no significant delays in developmental tasks.   Patient's highest education level was college graduate.  Patient identified the following learning problems: none reported.  Modifications will not be used to assist communication in therapy. Patient reports they are  able to understand written materials.    Patient reported the following relationship history as that of normal sexual and relational development.  Patient expressed having a few girlfriends prior to meeting his wife.  Patient's current relationship status is  for 10 years.   Patient identified their sexual orientation as heterosexual.  Patient reported having 2 child(kamari). Patient identified partner; parents as part of their support system.  " Patient identified the quality of these relationships as stable and meaningful.      Patient's current living/housing situation involves staying in own home/apartment.  The immediate members of family and household include , Yojana,Wife and they report that housing is stable.    Patient is currently employed fulltime.  Patient reports their finances are obtained through employment. Patient does not identify finances as a current stressor.      Patient reported that they have not been involved with the legal system. Patient does not report being under probation/ parole/ jurisdiction. They are not under any current court jurisdiction. .    Patient's Strengths and Limitations:  Patient identified the following strengths or resources that will help them succeed in treatment: commitment to health and well being, friends / good social support, family support, insight, intelligence, positive work environment, motivation, sense of humor and strong social skills. Things that may interfere with the patient's success in treatment include: none identified.     Assessments:  PHQ9: No flowsheet data found.  GAD7: No flowsheet data found.  PROMIS 10-Global Health (only subscores and total score):   PROMIS-10 Scores Only 3/23/2022   Global Mental Health Score 15   Global Physical Health Score 16   PROMIS TOTAL - SUBSCORES 31       Personal and Family Medical History:  Patient does not report a family history of mental health concerns.  Did express brother did have difficulties with depression. Patient reports family history includes Diabetes in his mother, paternal grandmother, sister, and sister; Hyperlipidemia in his father; Multiple Sclerosis in his maternal aunt, maternal uncle, and mother..     Patient does not report Mental Health Diagnosis or Treatment.      Patient has had a physical exam to rule out medical causes for current symptoms.  Date of last physical exam was within the past year. Client was encouraged to follow  up with PCP if symptoms were to develop. The patient has a [place] [facility] Provider, who is named Slick Polk.  Patient reports no current medical concerns.  Patient denies any issues with pain.  There are not significant appetite / nutritional concerns / weight changes.   Patient does not report a history of head injury / trauma / cognitive impairment.     Patient reports current meds as:   No outpatient medications have been marked as taking for the 3/27/22 encounter ([facility] Documentation) with [facility].       Medication Adherence:  Patient reports taking.  taking prescribed medications as prescribed.    Patient Allergies:  No Known Allergies    Medical History:    Past Medical History:   Diagnosis Date     Diabetes mellitus type 2 in nonobese (H) 01/01/2017    Type 2 dx at [facility] 12/2017     Hepatic steatosis      Hyperlipidemia      Obstructive sleep apnea      Vitamin D deficiency          Current Mental Status Exam:   Appearance:  Appropriate    Eye Contact:  Good   Psychomotor:  Normal       Gait / station:  no problem  Attitude / Demeanor: Cooperative   Speech      Rate / Production: Normal/ Responsive      Volume:  Normal  volume      Language:  intact  Mood:   Normal  Affect:   Appropriate    Thought Content: Clear   Thought Process: Coherent  Logical       Associations: No loosening of associations  Insight:   Fair   Judgment:  Intact   Orientation:  All  Attention/concentration: Good      Substance Use:  Patient did not report a family history of substance use concerns; see medical history section for details.  Patient has not received chemical dependency treatment in the past.  Patient has not ever been to detox.      Patient is not currently receiving any chemical dependency treatment.           Substance History of use Age of first use Date of last use     Pattern and duration of use (include amounts and frequency)   Alcohol currently use   20 3/20/2022 REPORTS SUBSTANCE USE:  reports using substance 4 times per week and has 3 beers or wine at a time.   Patient reports heaviest use was in college and shortly after.  However, it was never of a concern.   Cannabis   used in the past 20 11/23/2020 REPORTS SUBSTANCE USE: reports using substance 1 times per year and has 1 hit or so at a time.   Patient reports heaviest use was never of concern.     Amphetamines   never used     REPORTS SUBSTANCE USE: N/A   Cocaine/crack    never used       REPORTS SUBSTANCE USE: N/A   Hallucinogens never used         REPORTS SUBSTANCE USE: N/A   Inhalants never used         REPORTS SUBSTANCE USE: N/A   Heroin never used         REPORTS SUBSTANCE USE: N/A   Other Opiates never used     REPORTS SUBSTANCE USE: N/A   Benzodiazepine   never used     REPORTS SUBSTANCE USE: N/A   Barbiturates never used     REPORTS SUBSTANCE USE: N/A   Over the counter meds never used     REPORTS SUBSTANCE USE: N/A   Caffeine currently use 8   REPORTS SUBSTANCE USE: reports using substance 2 times per day and has 1 drink at a time.   Patient reports heaviest use was never of concerns.   Nicotine  used in the past 20 3/23/2007 REPORTS SUBSTANCE USE: N/A   Other substances not listed above:  Identify:  never used     REPORTS SUBSTANCE USE: N/A     Patient reported the following problems as a result of their substance use: no problems, not applicable.    Substance Use: No symptoms    Based on the negative CAGE score and clinical interview there  are not indications of drug or alcohol abuse.      Significant Losses / Trauma / Abuse / Neglect Issues:   Patient did not serve in the .  There are indications or report of significant loss, trauma, abuse or neglect issues related to: are indications or report of significant loss, trauma, abuse or neglect issues related to.  Concerns for possible neglect are not present.     Safety Assessment:   Patient denies current homicidal ideation and behaviors.  Patient denies current  self-injurious ideation and behaviors.    Patient denied risk behaviors associated with substance use.  Patient denies any high risk behaviors associated with mental health symptoms.  Patient reports the following current concerns for their personal safety: None.  Patient reports there are not firearms in the house.       History of Safety Concerns:  Patient denied a history of homicidal ideation.     Patient denied a history of personal safety concerns.    Patient denied a history of assaultive behaviors.    Patient denied a history of sexual assault behaviors.     Patient denied a history of risk behaviors associated with substance use.  Patient denies any history of high risk behaviors associated with mental health symptoms.  Patient reports the following protective factors: forward or future oriented thinking; dedication to family or friends; safe and stable environment; regular sleep; adherence with prescribed medication; effective problem solving skills; positive social skills; financial stability; strong sense of self worth or esteem; sense of personal control or determination    Risk Plan:  See Recommendations for Safety and Risk Management Plan    Review of Symptoms per patient report:  Depression: No symptoms  Kenzie:  No Symptoms  Psychosis: No Symptoms  Anxiety: No Symptoms  Panic:  No symptoms  Post Traumatic Stress Disorder:  No Symptoms   Eating Disorder: No Symptoms  ADD / ADHD:  No symptoms  Conduct Disorder: No symptoms  Autism Spectrum Disorder: No symptoms  Obsessive Compulsive Disorder: No Symptoms    Patient reports the following compulsive behaviors and treatment history: None identified.      Diagnostic Criteria:   Patient does not meet any of the criteria for any mental health diagnosis or complications at this time    Functional Status:  Patient reports the following functional impairments:  childcare / parenting and home life with wife and kids.     Nonprogrammatic care:  Patient is  requesting basic services to address current mental health concerns.    Clinical Summary:  1. Reason for assessment: Secondary reason for assessment was to learn effective ways to deal with anger, this was influenced and suggested by their wife.  Primary reason for assessment is to assess and rule out any other mental health complications or disorders. They expressed interest in therapy as well but due to the assessment, there is no clinical justification for individual therapy at this time, discussed other services such as marital therapy or support groups .  2. Psychosocial, Cultural and Contextual Factors: Relational  .  3. Principal DSM5 Diagnoses  (Sustained by DSM5 Criteria Listed Above):   No diagnosis  4. Other Diagnoses that is relevant to services:   No diagnosis  5. Provisional Diagnosis:  Adjustment Disorders  309.28 (F43.23) With mixed anxiety and depressed mood as evidenced by symptoms of anger but at this time do not meet the criteria.  6. Prognosis: Unknown.  7. Likely consequences of symptoms if not treated: Minimal consequences.  8. Client strengths include:  caring, educated, empathetic, employed, goal-focused, good listener, insightful, intelligent, motivated, open to learning, open to suggestions / feedback, responsible parent, support of family, friends and providers, willing to ask questions and willing to relate to others .     Recommendations:     1. Plan for Safety and Risk Management:   Recommended that patient call 911 or go to the local ED should there be a change in any of these risk factors..          Report to child / adult protection services was NA.     2. Patient's identified no compa, ethnic, cultural, sexual or gender preferences or consideration that are necessary into the development of treatment interventions or focus.      3. Initial Treatment will focus on:    No therapy indicated at this time.     4. Resources/Service Plan:    services are not  indicated.   Modifications to assist communication are not indicated.   Additional disability accommodations are not indicated.      5. Collaboration:   Collaboration / coordination of treatment will be initiated with the following  support professionals: No collaboration indicated at this time.      6.  Referrals:   The following referral(s) will be initiated: No referrals indicated at this time. Next Scheduled Appointment: Follow up for services but no future session.     A Release of Information has been obtained for the following: No release of information necessary at this time.    7. NOMI:    NOMI:  Discussed the general effects of drugs and alcohol on health and well-being. Provider gave patient printed information about the effects of chemical use on their health and well being. Recommendations:  No recommendations indicated at this time.     8. Records:   These were reviewed at time of assessment.   Information in this assessment was obtained from the medical record and provided by patient who is a good historian.    Patient will have open access to their mental health medical record.        Provider Name/ Credentials:  Jamie Blandon [facility]  March 14, 2022

## 2022-05-19 ENCOUNTER — MYC MEDICAL ADVICE (OUTPATIENT)
Dept: INTERNAL MEDICINE | Facility: CLINIC | Age: 47
End: 2022-05-19
Payer: COMMERCIAL

## 2022-05-19 DIAGNOSIS — E11.9 TYPE 2 DIABETES, HBA1C GOAL < 8% (H): Primary | ICD-10-CM

## 2022-05-19 DIAGNOSIS — K76.0 HEPATIC STEATOSIS: ICD-10-CM

## 2022-06-02 ENCOUNTER — LAB (OUTPATIENT)
Dept: LAB | Facility: CLINIC | Age: 47
End: 2022-06-02
Payer: COMMERCIAL

## 2022-06-02 DIAGNOSIS — E11.9 TYPE 2 DIABETES, HBA1C GOAL < 8% (H): ICD-10-CM

## 2022-06-02 DIAGNOSIS — K76.0 HEPATIC STEATOSIS: ICD-10-CM

## 2022-06-02 LAB
ALBUMIN SERPL-MCNC: 4.4 G/DL (ref 3.5–5)
ALP SERPL-CCNC: 89 U/L (ref 45–120)
ALT SERPL W P-5'-P-CCNC: 106 U/L (ref 0–45)
ANION GAP SERPL CALCULATED.3IONS-SCNC: 9 MMOL/L (ref 5–18)
AST SERPL W P-5'-P-CCNC: 64 U/L (ref 0–40)
BILIRUB SERPL-MCNC: 0.5 MG/DL (ref 0–1)
BUN SERPL-MCNC: 11 MG/DL (ref 8–22)
CALCIUM SERPL-MCNC: 9.6 MG/DL (ref 8.5–10.5)
CHLORIDE BLD-SCNC: 101 MMOL/L (ref 98–107)
CO2 SERPL-SCNC: 28 MMOL/L (ref 22–31)
CREAT SERPL-MCNC: 1.03 MG/DL (ref 0.7–1.3)
ERYTHROCYTE [DISTWIDTH] IN BLOOD BY AUTOMATED COUNT: 11.9 % (ref 10–15)
GFR SERPL CREATININE-BSD FRML MDRD: >90 ML/MIN/1.73M2
GLUCOSE BLD-MCNC: 176 MG/DL (ref 70–125)
HBA1C MFR BLD: 7.1 % (ref 0–5.6)
HCT VFR BLD AUTO: 45.7 % (ref 40–53)
HGB BLD-MCNC: 15.7 G/DL (ref 13.3–17.7)
MCH RBC QN AUTO: 28.9 PG (ref 26.5–33)
MCHC RBC AUTO-ENTMCNC: 34.4 G/DL (ref 31.5–36.5)
MCV RBC AUTO: 84 FL (ref 78–100)
PLATELET # BLD AUTO: 171 10E3/UL (ref 150–450)
POTASSIUM BLD-SCNC: 4.9 MMOL/L (ref 3.5–5)
PROT SERPL-MCNC: 7.3 G/DL (ref 6–8)
RBC # BLD AUTO: 5.43 10E6/UL (ref 4.4–5.9)
SODIUM SERPL-SCNC: 138 MMOL/L (ref 136–145)
WBC # BLD AUTO: 5.9 10E3/UL (ref 4–11)

## 2022-06-02 PROCEDURE — 83036 HEMOGLOBIN GLYCOSYLATED A1C: CPT

## 2022-06-02 PROCEDURE — 80053 COMPREHEN METABOLIC PANEL: CPT

## 2022-06-02 PROCEDURE — 36415 COLL VENOUS BLD VENIPUNCTURE: CPT

## 2022-06-02 PROCEDURE — 85027 COMPLETE CBC AUTOMATED: CPT

## 2022-06-20 PROBLEM — E11.9 TYPE 2 DIABETES, HBA1C GOAL < 8% (H): Status: ACTIVE | Noted: 2017-12-06

## 2022-08-06 ENCOUNTER — MYC REFILL (OUTPATIENT)
Dept: INTERNAL MEDICINE | Facility: CLINIC | Age: 47
End: 2022-08-06

## 2022-08-06 DIAGNOSIS — E11.9 TYPE 2 DIABETES, HBA1C GOAL < 8% (H): ICD-10-CM

## 2022-08-07 RX ORDER — METFORMIN HCL 500 MG
2000 TABLET, EXTENDED RELEASE 24 HR ORAL DAILY
Qty: 360 TABLET | Refills: 0 | Status: SHIPPED | OUTPATIENT
Start: 2022-08-07 | End: 2022-11-03

## 2022-08-07 NOTE — TELEPHONE ENCOUNTER
"Routing refill request to provider for review/approval because:  Patient needs to be seen because:  q6m    Last Written Prescription Date:  3/15/22  Last Fill Quantity: 360,  # refills: 0   Last office visit provider:  11/10/21     Requested Prescriptions   Pending Prescriptions Disp Refills     metFORMIN (GLUCOPHAGE XR) 500 MG 24 hr tablet 360 tablet 0     Sig: Take 4 tablets (2,000 mg) by mouth daily       Biguanide Agents Failed - 8/6/2022  9:23 AM        Failed - Recent (6 mo) or future (30 days) visit within the authorizing provider's specialty     Patient had office visit in the last 6 months or has a visit in the next 30 days with authorizing provider or within the authorizing provider's specialty.  See \"Patient Info\" tab in inbasket, or \"Choose Columns\" in Meds & Orders section of the refill encounter.            Passed - Patient is age 10 or older        Passed - Patient has documented A1c within the specified period of time.     If HgbA1C is 8 or greater, it needs to be on file within the past 3 months.  If less than 8, must be on file within the past 6 months.     Recent Labs   Lab Test 06/02/22  0809   A1C 7.1*             Passed - Patient's CR is NOT>1.4 OR Patient's EGFR is NOT<45 within past 12 mos.     Recent Labs   Lab Test 06/02/22  0809 11/05/21  0740 12/14/20  1508   GFRESTIMATED >90   < > >60   GFRESTBLACK  --   --  >60    < > = values in this interval not displayed.       Recent Labs   Lab Test 06/02/22  0809   CR 1.03             Passed - Patient does NOT have a diagnosis of CHF.        Passed - Medication is active on med list             Leah Ho RN 08/06/22 10:11 PM  "

## 2022-11-02 DIAGNOSIS — E11.9 TYPE 2 DIABETES, HBA1C GOAL < 8% (H): ICD-10-CM

## 2022-11-03 RX ORDER — METFORMIN HCL 500 MG
2000 TABLET, EXTENDED RELEASE 24 HR ORAL DAILY
Qty: 360 TABLET | Refills: 0 | Status: SHIPPED | OUTPATIENT
Start: 2022-11-03 | End: 2023-01-13

## 2022-11-03 NOTE — TELEPHONE ENCOUNTER
"Routing refill request to provider for review/approval because:  Patient is due for follow up    Last Written Prescription Date:  8/7/22  Last Fill Quantity: 360,  # refills: 0   Last office visit provider:  11/10/21     Requested Prescriptions   Pending Prescriptions Disp Refills     metFORMIN (GLUCOPHAGE XR) 500 MG 24 hr tablet 360 tablet 0     Sig: Take 4 tablets (2,000 mg) by mouth daily       Biguanide Agents Failed - 11/2/2022 12:06 PM        Failed - Recent (6 mo) or future (30 days) visit within the authorizing provider's specialty     Patient had office visit in the last 6 months or has a visit in the next 30 days with authorizing provider or within the authorizing provider's specialty.  See \"Patient Info\" tab in inbasket, or \"Choose Columns\" in Meds & Orders section of the refill encounter.            Passed - Patient is age 10 or older        Passed - Patient has documented A1c within the specified period of time.     If HgbA1C is 8 or greater, it needs to be on file within the past 3 months.  If less than 8, must be on file within the past 6 months.     Recent Labs   Lab Test 06/02/22  0809   A1C 7.1*             Passed - Patient's CR is NOT>1.4 OR Patient's EGFR is NOT<45 within past 12 mos.     Recent Labs   Lab Test 06/02/22  0809 11/05/21  0740 12/14/20  1508   GFRESTIMATED >90   < > >60   GFRESTBLACK  --   --  >60    < > = values in this interval not displayed.       Recent Labs   Lab Test 06/02/22  0809   CR 1.03             Passed - Patient does NOT have a diagnosis of CHF.        Passed - Medication is active on med list             Mckayla Rodriguez RN 11/03/22 1:04 PM  "

## 2022-11-20 ENCOUNTER — HEALTH MAINTENANCE LETTER (OUTPATIENT)
Age: 47
End: 2022-11-20

## 2022-11-29 DIAGNOSIS — E11.9 TYPE 2 DIABETES, HBA1C GOAL < 8% (H): ICD-10-CM

## 2022-11-30 NOTE — TELEPHONE ENCOUNTER
"Routing refill request to provider for review/approval because:  Patient needs to be seen because:  q6m    Last Written Prescription Date:  11/8/21  Last Fill Quantity: 3,  # refills: 11   Last office visit provider:  11/10/21     Requested Prescriptions   Pending Prescriptions Disp Refills     semaglutide (OZEMPIC) 2 MG/1.5ML SOPN pen 3 mL 11     Sig: Inject 0.5 mg Subcutaneous every 7 days       GLP-1 Agonists Protocol Failed - 11/29/2022  7:29 PM        Failed - Recent (6 mo) or future (30 days) visit within the authorizing provider's specialty     Patient had office visit in the last 6 months or has a visit in the next 30 days with authorizing provider.  See \"Patient Info\" tab in inbasket, or \"Choose Columns\" in Meds & Orders section of the refill encounter.            Passed - HgbA1C in past 3 or 6 months     If HgbA1C is 8 or greater, it needs to be on file within the past 3 months.  If less than 8, must be on file within the past 6 months.     Recent Labs   Lab Test 06/02/22  0809   A1C 7.1*             Passed - Medication is active on med list        Passed - Patient is age 18 or older        Passed - Normal serum creatinine on file in past 12 months     Recent Labs   Lab Test 06/02/22  0809   CR 1.03       Ok to refill medication if creatinine is low               Leah Ho, RN 11/30/22 11:22 AM  "

## 2022-12-24 ENCOUNTER — HEALTH MAINTENANCE LETTER (OUTPATIENT)
Age: 47
End: 2022-12-24

## 2023-01-13 ENCOUNTER — MYC REFILL (OUTPATIENT)
Dept: INTERNAL MEDICINE | Facility: CLINIC | Age: 48
End: 2023-01-13

## 2023-01-13 ENCOUNTER — MYC MEDICAL ADVICE (OUTPATIENT)
Dept: INTERNAL MEDICINE | Facility: CLINIC | Age: 48
End: 2023-01-13

## 2023-01-13 DIAGNOSIS — E11.9 TYPE 2 DIABETES, HBA1C GOAL < 8% (H): ICD-10-CM

## 2023-01-13 DIAGNOSIS — E78.5 HYPERLIPIDEMIA LDL GOAL <100: ICD-10-CM

## 2023-01-13 NOTE — TELEPHONE ENCOUNTER
Outpatient Medication Detail     Disp Refills Start End MAXI   metFORMIN (GLUCOPHAGE XR) 500 MG 24 hr tablet 360 tablet 0 11/3/2022  No   Sig - Route: Take 4 tablets (2,000 mg) by mouth daily - Oral   Sent to pharmacy as: metFORMIN HCl  MG Oral Tablet Extended Release 24 Hour (GLUCOPHAGE XR)   Class: E-Prescribe   Order: [id number]   E-Prescribing Status: Receipt confirmed by pharmacy (11/3/2022  2:04 PM CDT)     Outpatient Medication Detail     Disp Refills Start End MAXI   atorvastatin (LIPITOR) 20 MG tablet 90 tablet 3 1/24/2022  No   Sig - Route: Take 1 tablet (20 mg) by mouth daily - Oral   Sent to pharmacy as: Atorvastatin Calcium 20 MG Oral Tablet (LIPITOR)   Class: E-Prescribe   Order: [id number]   E-Prescribing Status: Receipt confirmed by pharmacy (1/24/2022  9:38 AM CST)     Routing refill request to provider for review/approval because:  Drug not on the [facility] refill protocol (Lipitor)  Labs not current:  A1c is greater than 6 mo  Patient needs to be seen because:  Not seen within the last 6 months and no visit upcoming within the next 30 days     Last Written Prescription Date:  11/3/22 ; 1/24/22  Last Fill Quantity: 360 ; 90,  # refills: 0 ; 3   Last office visit provider:  11/10/21     Requested Prescriptions   Pending Prescriptions Disp Refills     metFORMIN (GLUCOPHAGE XR) 500 MG 24 hr tablet 360 tablet 0     Sig: Take 4 tablets (2,000 mg) by mouth daily       Biguanide Agents Failed - 1/13/2023  4:36 PM        Failed - Patient has documented A1c within the specified period of time.     If HgbA1C is 8 or greater, it needs to be on file within the past 3 months.  If less than 8, must be on file within the past 6 months.     Recent Labs   Lab Test 06/02/22  0809   A1C 7.1*             Failed - Recent (6 mo) or future (30 days) visit within the authorizing provider's specialty     Patient had office visit in the last 6 months or has a visit in the next 30 days with authorizing provider or within the  "authorizing provider's specialty.  See \"Patient Info\" tab in inbasket, or \"Choose Columns\" in Meds & Orders section of the refill encounter.            Passed - Patient is age 10 or older        Passed - Patient's CR is NOT>1.4 OR Patient's EGFR is NOT<45 within past 12 mos.     Recent Labs   Lab Test 06/02/22  0809 11/05/21  0740 12/14/20  1508   GFRESTIMATED >90   < > >60   GFRESTBLACK  --   --  >60    < > = values in this interval not displayed.       Recent Labs   Lab Test 06/02/22  0809   CR 1.03             Passed - Patient does NOT have a diagnosis of CHF.        Passed - Medication is active on med list           atorvastatin (LIPITOR) 20 MG tablet 90 tablet 3     Sig: Take 1 tablet (20 mg) by mouth daily       There is no refill protocol information for this order          WILLIAMS GÓMEZ RN 01/13/23 4:38 PM  "

## 2023-01-16 RX ORDER — METFORMIN HCL 500 MG
2000 TABLET, EXTENDED RELEASE 24 HR ORAL DAILY
Qty: 360 TABLET | Refills: 0 | Status: SHIPPED | OUTPATIENT
Start: 2023-01-16 | End: 2023-04-18

## 2023-01-16 RX ORDER — ATORVASTATIN CALCIUM 20 MG/1
20 TABLET, FILM COATED ORAL DAILY
Qty: 90 TABLET | Refills: 0 | Status: SHIPPED | OUTPATIENT
Start: 2023-01-16 | End: 2023-04-18

## 2023-04-17 DIAGNOSIS — E11.9 DIABETES MELLITUS, TYPE 2 (H): ICD-10-CM

## 2023-04-17 DIAGNOSIS — E11.9 TYPE 2 DIABETES, HBA1C GOAL < 8% (H): ICD-10-CM

## 2023-04-17 DIAGNOSIS — E78.5 HYPERLIPIDEMIA LDL GOAL <100: ICD-10-CM

## 2023-04-18 RX ORDER — ATORVASTATIN CALCIUM 20 MG/1
20 TABLET, FILM COATED ORAL DAILY
Qty: 90 TABLET | Refills: 0 | Status: SHIPPED | OUTPATIENT
Start: 2023-04-18 | End: 2023-07-14

## 2023-04-18 RX ORDER — METFORMIN HCL 500 MG
2000 TABLET, EXTENDED RELEASE 24 HR ORAL DAILY
Qty: 360 TABLET | Refills: 0 | Status: SHIPPED | OUTPATIENT
Start: 2023-04-18 | End: 2023-08-10

## 2023-04-18 NOTE — TELEPHONE ENCOUNTER
"  Lipitor  Routing refill request to provider for review/approval because:  Labs not current:  LDL  Patient needs to be seen because it has been more than 1 year since last office visit.    Last Written Prescription Date:  1/16/2023  Last Fill Quantity: 90,  # refills: 0   Last office visit provider:  11/10/2021     Metformin  Routing refill request to provider for review/approval because:  Labs not current:  A1c  Patient needs to be seen because it has been more than 1 year since last office visit.    Last Written Prescription Date:  1/16/2023  Last Fill Quantity: 360,  # refills: 0   Last office visit provider:  11/10/2021     Test strips  Routing refill request to provider for review/approval because:  Patient needs to be seen because it has been more than 1 year since last office visit.    Last Written Prescription Date:  11/1/2021  Last Fill Quantity: 200,  # refills: 3   Last office visit provider:  11/10/2021     Requested Prescriptions   Pending Prescriptions Disp Refills     atorvastatin (LIPITOR) 20 MG tablet 90 tablet 0     Sig: Take 1 tablet (20 mg) by mouth daily       Statins Protocol Failed - 4/17/2023  1:57 PM        Failed - LDL on file in past 12 months     Recent Labs   Lab Test 11/05/21  0740   LDL 58             Failed - Recent (12 mo) or future (30 days) visit within the authorizing provider's specialty     Patient has had an office visit with the authorizing provider or a provider within the authorizing providers department within the previous 12 mos or has a future within next 30 days. See \"Patient Info\" tab in inbasket, or \"Choose Columns\" in Meds & Orders section of the refill encounter.              Passed - No abnormal creatine kinase in past 12 months     No lab results found.             Passed - Medication is active on med list        Passed - Patient is age 18 or older           metFORMIN (GLUCOPHAGE XR) 500 MG 24 hr tablet 360 tablet 0     Sig: Take 4 tablets (2,000 mg) by mouth " "daily       Biguanide Agents Failed - 4/17/2023  1:57 PM        Failed - Patient has documented A1c within the specified period of time.     If HgbA1C is 8 or greater, it needs to be on file within the past 3 months.  If less than 8, must be on file within the past 6 months.     Recent Labs   Lab Test 06/02/22  0809   A1C 7.1*             Failed - Recent (6 mo) or future (30 days) visit within the authorizing provider's specialty     Patient had office visit in the last 6 months or has a visit in the next 30 days with authorizing provider or within the authorizing provider's specialty.  See \"Patient Info\" tab in inbasket, or \"Choose Columns\" in Meds & Orders section of the refill encounter.            Passed - Patient is age 10 or older        Passed - Patient's CR is NOT>1.4 OR Patient's EGFR is NOT<45 within past 12 mos.     Recent Labs   Lab Test 06/02/22  0809 11/05/21  0740 12/14/20  1508   GFRESTIMATED >90   < > >60   GFRESTBLACK  --   --  >60    < > = values in this interval not displayed.       Recent Labs   Lab Test 06/02/22  0809   CR 1.03             Passed - Patient does NOT have a diagnosis of CHF.        Passed - Medication is active on med list           blood glucose (NO BRAND SPECIFIED) test strip 200 strip 3     Sig: Use to test blood sugar 2 times daily. For use with one touch ultra 2 meter.       Diabetic Supplies Protocol Failed - 4/17/2023  1:57 PM        Failed - Recent (6 mo) or future (30 days) visit within the authorizing provider's specialty     Patient had office visit in the last 6 months or has a visit in the next 30 days with authorizing provider.  See \"Patient Info\" tab in inbasket, or \"Choose Columns\" in Meds & Orders section of the refill encounter.            Passed - Medication is active on med list        Passed - Patient is 18 years of age or older             Hanna Mcadams RN 04/17/23 9:52 PM  "

## 2023-06-16 ENCOUNTER — MYC MEDICAL ADVICE (OUTPATIENT)
Dept: INTERNAL MEDICINE | Facility: CLINIC | Age: 48
End: 2023-06-16
Payer: COMMERCIAL

## 2023-06-20 DIAGNOSIS — E78.5 HYPERLIPIDEMIA LDL GOAL <100: Primary | ICD-10-CM

## 2023-06-20 DIAGNOSIS — E11.9 TYPE 2 DIABETES, HBA1C GOAL < 8% (H): ICD-10-CM

## 2023-07-08 ENCOUNTER — HEALTH MAINTENANCE LETTER (OUTPATIENT)
Age: 48
End: 2023-07-08

## 2023-07-10 ENCOUNTER — LAB (OUTPATIENT)
Dept: LAB | Facility: CLINIC | Age: 48
End: 2023-07-10
Payer: COMMERCIAL

## 2023-07-10 DIAGNOSIS — E11.9 TYPE 2 DIABETES, HBA1C GOAL < 8% (H): ICD-10-CM

## 2023-07-10 DIAGNOSIS — E78.5 HYPERLIPIDEMIA LDL GOAL <100: ICD-10-CM

## 2023-07-10 LAB
ALBUMIN SERPL BCG-MCNC: 4.7 G/DL (ref 3.5–5.2)
ALP SERPL-CCNC: 78 U/L (ref 40–129)
ALT SERPL W P-5'-P-CCNC: 79 U/L (ref 0–70)
ANION GAP SERPL CALCULATED.3IONS-SCNC: 11 MMOL/L (ref 7–15)
AST SERPL W P-5'-P-CCNC: 41 U/L (ref 0–45)
BILIRUB SERPL-MCNC: 0.3 MG/DL
BUN SERPL-MCNC: 14.8 MG/DL (ref 6–20)
CALCIUM SERPL-MCNC: 9.3 MG/DL (ref 8.6–10)
CHLORIDE SERPL-SCNC: 102 MMOL/L (ref 98–107)
CHOLEST SERPL-MCNC: 131 MG/DL
CREAT SERPL-MCNC: 0.97 MG/DL (ref 0.67–1.17)
DEPRECATED HCO3 PLAS-SCNC: 25 MMOL/L (ref 22–29)
ERYTHROCYTE [DISTWIDTH] IN BLOOD BY AUTOMATED COUNT: 12.2 % (ref 10–15)
GFR SERPL CREATININE-BSD FRML MDRD: >90 ML/MIN/1.73M2
GLUCOSE SERPL-MCNC: 183 MG/DL (ref 70–99)
HBA1C MFR BLD: 7.7 % (ref 0–5.6)
HCT VFR BLD AUTO: 42.8 % (ref 40–53)
HDLC SERPL-MCNC: 37 MG/DL
HGB BLD-MCNC: 14.4 G/DL (ref 13.3–17.7)
LDLC SERPL CALC-MCNC: 46 MG/DL
MCH RBC QN AUTO: 28.8 PG (ref 26.5–33)
MCHC RBC AUTO-ENTMCNC: 33.6 G/DL (ref 31.5–36.5)
MCV RBC AUTO: 86 FL (ref 78–100)
NONHDLC SERPL-MCNC: 94 MG/DL
PLATELET # BLD AUTO: 153 10E3/UL (ref 150–450)
POTASSIUM SERPL-SCNC: 4.7 MMOL/L (ref 3.4–5.3)
PROT SERPL-MCNC: 7.1 G/DL (ref 6.4–8.3)
RBC # BLD AUTO: 5 10E6/UL (ref 4.4–5.9)
SODIUM SERPL-SCNC: 138 MMOL/L (ref 136–145)
TRIGL SERPL-MCNC: 240 MG/DL
WBC # BLD AUTO: 4.5 10E3/UL (ref 4–11)

## 2023-07-10 PROCEDURE — 83036 HEMOGLOBIN GLYCOSYLATED A1C: CPT

## 2023-07-10 PROCEDURE — 85027 COMPLETE CBC AUTOMATED: CPT

## 2023-07-10 PROCEDURE — 80061 LIPID PANEL: CPT

## 2023-07-10 PROCEDURE — 36415 COLL VENOUS BLD VENIPUNCTURE: CPT

## 2023-07-10 PROCEDURE — 80053 COMPREHEN METABOLIC PANEL: CPT

## 2023-07-14 DIAGNOSIS — E78.5 HYPERLIPIDEMIA LDL GOAL <100: ICD-10-CM

## 2023-07-14 RX ORDER — ATORVASTATIN CALCIUM 20 MG/1
20 TABLET, FILM COATED ORAL DAILY
Qty: 90 TABLET | Refills: 0 | Status: SHIPPED | OUTPATIENT
Start: 2023-07-14 | End: 2023-08-18

## 2023-07-14 NOTE — TELEPHONE ENCOUNTER
"Routing refill request to provider for review/approval because:  Patient needs to be seen because it has been more than 1 year since last office visit. Has OV scheduled for 7/20/23    Last Written Prescription Date:  4/18/23  Last Fill Quantity: 90,  # refills: 0   Last office visit provider:  11/10/21    Requested Prescriptions   Pending Prescriptions Disp Refills     atorvastatin (LIPITOR) 20 MG tablet 90 tablet 0     Sig: Take 1 tablet (20 mg) by mouth daily       Statins Protocol Failed - 7/14/2023  3:41 PM        Failed - Recent (12 mo) or future (30 days) visit within the authorizing provider's specialty     Patient has had an office visit with the authorizing provider or a provider within the authorizing providers department within the previous 12 mos or has a future within next 30 days. See \"Patient Info\" tab in inbasket, or \"Choose Columns\" in Meds & Orders section of the refill encounter.              Passed - LDL on file in past 12 months     Recent Labs   Lab Test 07/10/23  0838   LDL 46             Passed - No abnormal creatine kinase in past 12 months     No lab results found.             Passed - Medication is active on med list        Passed - Patient is age 18 or older             Opal Elizabeth RN 07/14/23 4:03 PM  "

## 2023-07-17 LAB — RETINOPATHY: NEGATIVE

## 2023-07-20 ENCOUNTER — OFFICE VISIT (OUTPATIENT)
Dept: INTERNAL MEDICINE | Facility: CLINIC | Age: 48
End: 2023-07-20
Payer: COMMERCIAL

## 2023-07-20 VITALS
HEIGHT: 73 IN | WEIGHT: 240.1 LBS | RESPIRATION RATE: 16 BRPM | SYSTOLIC BLOOD PRESSURE: 143 MMHG | BODY MASS INDEX: 31.82 KG/M2 | OXYGEN SATURATION: 96 % | HEART RATE: 71 BPM | TEMPERATURE: 97.7 F | DIASTOLIC BLOOD PRESSURE: 92 MMHG

## 2023-07-20 DIAGNOSIS — Z12.11 SCREEN FOR COLON CANCER: ICD-10-CM

## 2023-07-20 DIAGNOSIS — E11.9 TYPE 2 DIABETES, HBA1C GOAL < 8% (H): ICD-10-CM

## 2023-07-20 DIAGNOSIS — Z11.4 SCREENING FOR HIV (HUMAN IMMUNODEFICIENCY VIRUS): ICD-10-CM

## 2023-07-20 DIAGNOSIS — K42.0 UMBILICAL HERNIA WITH OBSTRUCTION: ICD-10-CM

## 2023-07-20 DIAGNOSIS — E78.5 HYPERLIPIDEMIA LDL GOAL <130: Primary | ICD-10-CM

## 2023-07-20 DIAGNOSIS — G47.33 OSA (OBSTRUCTIVE SLEEP APNEA): ICD-10-CM

## 2023-07-20 DIAGNOSIS — K42.9 UMBILICAL HERNIA WITHOUT OBSTRUCTION AND WITHOUT GANGRENE: ICD-10-CM

## 2023-07-20 PROCEDURE — 90715 TDAP VACCINE 7 YRS/> IM: CPT | Performed by: INTERNAL MEDICINE

## 2023-07-20 PROCEDURE — 99214 OFFICE O/P EST MOD 30 MIN: CPT | Mod: 25 | Performed by: INTERNAL MEDICINE

## 2023-07-20 PROCEDURE — 90471 IMMUNIZATION ADMIN: CPT | Performed by: INTERNAL MEDICINE

## 2023-07-20 NOTE — PROGRESS NOTES
ASSESSMENT AND PLAN:    1. Screen for colon cancer  - Colonoscopy Screening  Referral; Future    2. Type 2 diabetes, HbA1C goal < 8%   He agrees to increased dose for additional weight loss. Recent A1c is satisfactory, could be better as well.   - Semaglutide, 1 MG/DOSE, (OZEMPIC) 4 MG/3ML pen; Inject 1 mg Subcutaneous every 7 days  Dispense: 3 mL; Refill: 0    3. Hyperlipidemia LDL goal <130  Ongoing statin therapy.     4. Umbilical hernia without obstruction and without gangrene  Small, easily reduced.  He is interested in pursuing surgery.   - Adult General Surg Referral; Future    5.  ALFREDO (obstructive sleep apnea)  Ongoing CPA    6. Observation for hypertension  I get 130 /80 large cuff left arm, sitting. Will monitor.  We discuss low salt diet.     Follow up 6 months.     CHIEF COMPLAINT:  Follow up.     HISTORY OF PRESENT ILLNESS:  Arturo Mei is a 48 year old male here in follow up.  He has noted diastasis recti, and an umbilical hernia.  He tolerates the ozempic well, with stable weight, no weight loss.  No unusual dyspnea or cough.  No bowel or bladder issues.      REVIEW OF SYSTEMS:   See HPI, all other systems on review are negative.    Past Medical History:   Diagnosis Date     Hepatic steatosis      Hyperlipidemia LDL goal <130      Obstructive sleep apnea      Type 2 diabetes, HbA1C goal < 8% (H)      Umbilical hernia with obstruction 7/20/2023     Vitamin D deficiency      History   Smoking Status     Never   Smokeless Tobacco     Never     Family History   Problem Relation Age of Onset     Diabetes Mother      Multiple Sclerosis Mother      Hyperlipidemia Father         hepatic steatosis     Diabetes Sister      Diabetes Sister      Diabetes Paternal Grandmother      Multiple Sclerosis Maternal Aunt      Multiple Sclerosis Maternal Uncle      Past Surgical History:   Procedure Laterality Date     ARTHROSCOPY KNEE      Description: Arthroscopy Knee;  Recorded: 05/29/2013;  Comments:  "medial meniscus     SEPTOPLASTY  2007     VITALS:  Vitals:    07/20/23 0717 07/20/23 0723   BP: (!) 151/91 (!) 143/92   BP Location: Left arm    Patient Position: Sitting    Cuff Size: Adult Regular    Pulse: 71    Resp: 16    Temp: 97.7  F (36.5  C)    TempSrc: Oral    SpO2: 96%    Weight: 108.9 kg (240 lb 1.6 oz)    Height: 1.86 m (6' 1.23\")      Wt Readings from Last 3 Encounters:   07/20/23 108.9 kg (240 lb 1.6 oz)   11/10/21 110.6 kg (243 lb 12.8 oz)   12/16/20 109.3 kg (241 lb)     PHYSICAL EXAM:  Constitutional:  In NAD, alert and oriented  Neck: no cervical or axillary adenopathy  Cardiac:  S1 S2   Lungs: Clear  Abdomen:   Soft, flat and non-tender, without guarding, rebound, or mass.  There is a small umbilical hernia, and diastasis recti.     DECISION TO OBTAIN OLD RECORDS AND/OR OBTAIN HISTORY FROM SOMEONE OTHER THAN PATIENT, AND/OR ACCESSING CARE EVERYWHERE):  1  0     REVIEW AND SUMMARIZATION OF OLD RECORDS, AND/OR OBTAINING HISTORY FROM SOMEONE OTHER THAN PATIENT, AND/OR DISCUSSION OF CASE WITH ANOTHER HEALTH CARE PROVIDER:  2 reviewed recent notes    REVIEW AND/OR ORDER OF OF CLINICAL LAB TESTS: 1  Reviewed recent labs.    REVIEW AND/OR ORDER OF RADIOLOGY TESTS: 1 0.    REVIEW AND/OR ORDER OF MEDICAL TESTS (EKG/ECHO/COLONOSCOPY/EGD): 1  Ordered cologuard    INDEPENDENT  VISUALIZATION OF IMAGE, TRACING, OR SPECIMEN ITSELF (2 EACH):  0     TOTAL: 4    Current Outpatient Medications   Medication Sig Dispense Refill     atorvastatin (LIPITOR) 20 MG tablet Take 1 tablet (20 mg) by mouth daily 90 tablet 0     blood glucose (NO BRAND SPECIFIED) test strip Use to test blood sugar 2 times daily. For use with one touch ultra 2 meter. 200 strip 3     fish oil-omega-3 fatty acids 1000 MG capsule Take 1,000 mg by mouth daily       metFORMIN (GLUCOPHAGE XR) 500 MG 24 hr tablet Take 4 tablets (2,000 mg) by mouth daily 360 tablet 0     semaglutide (OZEMPIC) 2 MG/1.5ML SOPN pen Inject 0.5 mg Subcutaneous every 7 days " 3 mL 11     Semaglutide, 1 MG/DOSE, (OZEMPIC) 4 MG/3ML pen Inject 1 mg Subcutaneous every 7 days 3 mL 0     Slick Polk MD  Internal Medicine  [facility]

## 2023-07-21 ENCOUNTER — TRANSFERRED RECORDS (OUTPATIENT)
Dept: MULTI SPECIALTY CLINIC | Facility: CLINIC | Age: 48
End: 2023-07-21
Payer: COMMERCIAL

## 2023-07-27 ENCOUNTER — MYC MEDICAL ADVICE (OUTPATIENT)
Dept: INTERNAL MEDICINE | Facility: CLINIC | Age: 48
End: 2023-07-27
Payer: COMMERCIAL

## 2023-07-28 ENCOUNTER — OFFICE VISIT (OUTPATIENT)
Dept: SURGERY | Facility: CLINIC | Age: 48
End: 2023-07-28
Payer: COMMERCIAL

## 2023-07-28 VITALS
DIASTOLIC BLOOD PRESSURE: 88 MMHG | HEIGHT: 73 IN | SYSTOLIC BLOOD PRESSURE: 128 MMHG | WEIGHT: 243.8 LBS | BODY MASS INDEX: 32.31 KG/M2

## 2023-07-28 DIAGNOSIS — K42.9 UMBILICAL HERNIA WITHOUT OBSTRUCTION AND WITHOUT GANGRENE: ICD-10-CM

## 2023-07-28 PROCEDURE — 99243 OFF/OP CNSLTJ NEW/EST LOW 30: CPT | Performed by: SURGERY

## 2023-07-28 RX ORDER — ACETAMINOPHEN 325 MG/1
975 TABLET ORAL ONCE
Status: CANCELLED | OUTPATIENT
Start: 2023-07-28 | End: 2023-07-28

## 2023-07-28 NOTE — LETTER
7/28/2023         RE: Arturo Mei  [address] [place] [address]        Dear Colleague,    Thank you for referring your patient, Arturo Mei, to the [facility] [place]. Please see a copy of my visit note below.    HPI:  Arturo Mei is a 48 year old male who was referred to me by Slick Polk for evaluation of an umbilical hernia.  He presents with complaints of a bulge at the umbilical region with mild discomfort.   He has been aware of this for the past several months.  He denies any nausea, vomiting, fevers, chills, bloody bowel movements or any other complaints at this time.  He specifically denies any obstipation, bloating, or development of a hard mass at this site that fails to reduce.       Allergies:Patient has no known allergies.    Past Medical History:   Diagnosis Date     Hepatic steatosis      Hyperlipidemia LDL goal <130      Obstructive sleep apnea      Type 2 diabetes, HbA1C goal < 8% (H)      Umbilical hernia with obstruction 7/20/2023     Vitamin D deficiency        Past Surgical History:   Procedure Laterality Date     ARTHROSCOPY KNEE      Description: Arthroscopy Knee;  Recorded: 05/29/2013;  Comments: medial meniscus     SEPTOPLASTY  2007       CURRENT MEDS:  Current Outpatient Medications   Medication Sig Dispense Refill     atorvastatin (LIPITOR) 20 MG tablet Take 1 tablet (20 mg) by mouth daily 90 tablet 0     blood glucose (NO BRAND SPECIFIED) test strip Use to test blood sugar 2 times daily. For use with one touch ultra 2 meter. 200 strip 3     fish oil-omega-3 fatty acids 1000 MG capsule Take 1,000 mg by mouth daily       metFORMIN (GLUCOPHAGE XR) 500 MG 24 hr tablet Take 4 tablets (2,000 mg) by mouth daily 360 tablet 0     Semaglutide, 1 MG/DOSE, (OZEMPIC) 4 MG/3ML pen Inject 1 mg Subcutaneous every 7 days 3 mL 0     semaglutide (OZEMPIC) 2 MG/1.5ML SOPN pen Inject 0.5 mg Subcutaneous every 7  "days (Patient not taking: Reported on 7/28/2023) 3 mL 11       Family History   Problem Relation Age of Onset     Diabetes Mother      Multiple Sclerosis Mother      Hyperlipidemia Father         hepatic steatosis     Diabetes Sister      Diabetes Sister      Diabetes Paternal Grandmother      Multiple Sclerosis Maternal Aunt      Multiple Sclerosis Maternal Uncle         reports that he has never smoked. He has never used smokeless tobacco. He reports current alcohol use. He reports that he does not use drugs.    Review of Systems -   The 10 point review of systems  is within normal limits except for as mentioned above in the HPI.  General ROS: No complaints or constitutional symptoms  Skin: No complaints or symptoms   Hematologic/Lymphatic: No symptoms or complaints  Psychiatric: No symptoms or complaints  Endocrine: No excessive fatigue, no hypermetabolic symptoms reported  Respiratory ROS: no cough, shortness of breath, or wheezing  Cardiovascular ROS: no chest pain or dyspnea on exertion  Gastrointestinal ROS: As per HPI  Musculoskeletal ROS: no recent injuries reported  Neurological ROS: no focal neurologic defects reported.        /88   Ht 1.854 m (6' 1\")   Wt 110.6 kg (243 lb 12.8 oz)   BMI 32.17 kg/m    Body mass index is 32.17 kg/m .    EXAM:  General : Alert, cooperative, appears stated age   Skin: Skin color, texture, turgor normal, no rashes or lesions   Lymphatic: No obvious adenopathy, no swelling   Eyes: No scleral icterus, pupils equal  HENT: no traumatic injury to the head or face, no gross abnormalities  Lungs: Normal respiratory effort, breath sounds equal bilaterally  Heart: Regular rate and rhythm  Abdomen: small umbilical hernia containing fat; roughly 1 cm in size  Musculoskeletal: No obvious swelling,  Neurologic: Grossly intact      Assessment/Plan:    Arturo Mei is a 48 year old male with an umbilical hernia.  The pathophysiology of umbilical hernias was discussed as were " the surgical and non-operative management strategies.      We discussed both open and laparoscopic approaches, and the respective risks and benefits of each approach.  We similarly discussed the role and specific risks associated with mesh placement and primary repair.  The risks of surgery in general were discussed which include, but are not limited to, bleeding, infection, recurrent hernia, chronic pain, poor cosmesis, blood clots, stroke, heart attack and death.  Additionally, the risks of observation were discussed which include, but are not limited to, enlargement of the hernia, incarceration, strangulation, pain and death.      He understands everything which was discussed and has consented to proceed with an open umbilical hernia repair with mesh.  We will therefore schedule surgery accordingly.      Ar Saxena MD, FACS  Office: [phone]  [facility]   General and Bariatric Surgery      Again, thank you for allowing me to participate in the care of your patient.        Sincerely,        Ar Saxena MD

## 2023-07-28 NOTE — PROGRESS NOTES
HPI:  Arturo Mei is a 48 year old male who was referred to me by Slick Polk for evaluation of an umbilical hernia.  He presents with complaints of a bulge at the umbilical region with mild discomfort.   He has been aware of this for the past several months.  He denies any nausea, vomiting, fevers, chills, bloody bowel movements or any other complaints at this time.  He specifically denies any obstipation, bloating, or development of a hard mass at this site that fails to reduce.       Allergies:Patient has no known allergies.    Past Medical History:   Diagnosis Date    Hepatic steatosis     Hyperlipidemia LDL goal <130     Obstructive sleep apnea     Type 2 diabetes, HbA1C goal < 8% (H)     Umbilical hernia with obstruction 7/20/2023    Vitamin D deficiency        Past Surgical History:   Procedure Laterality Date    ARTHROSCOPY KNEE      Description: Arthroscopy Knee;  Recorded: 05/29/2013;  Comments: medial meniscus    SEPTOPLASTY  2007       CURRENT MEDS:  Current Outpatient Medications   Medication Sig Dispense Refill    atorvastatin (LIPITOR) 20 MG tablet Take 1 tablet (20 mg) by mouth daily 90 tablet 0    blood glucose (NO BRAND SPECIFIED) test strip Use to test blood sugar 2 times daily. For use with one touch ultra 2 meter. 200 strip 3    fish oil-omega-3 fatty acids 1000 MG capsule Take 1,000 mg by mouth daily      metFORMIN (GLUCOPHAGE XR) 500 MG 24 hr tablet Take 4 tablets (2,000 mg) by mouth daily 360 tablet 0    Semaglutide, 1 MG/DOSE, (OZEMPIC) 4 MG/3ML pen Inject 1 mg Subcutaneous every 7 days 3 mL 0    semaglutide (OZEMPIC) 2 MG/1.5ML SOPN pen Inject 0.5 mg Subcutaneous every 7 days (Patient not taking: Reported on 7/28/2023) 3 mL 11       Family History   Problem Relation Age of Onset    Diabetes Mother     Multiple Sclerosis Mother     Hyperlipidemia Father         hepatic steatosis    Diabetes Sister     Diabetes Sister     Diabetes Paternal Grandmother     Multiple Sclerosis  "Maternal Aunt     Multiple Sclerosis Maternal Uncle         reports that he has never smoked. He has never used smokeless tobacco. He reports current alcohol use. He reports that he does not use drugs.    Review of Systems -   The 10 point review of systems  is within normal limits except for as mentioned above in the HPI.  General ROS: No complaints or constitutional symptoms  Skin: No complaints or symptoms   Hematologic/Lymphatic: No symptoms or complaints  Psychiatric: No symptoms or complaints  Endocrine: No excessive fatigue, no hypermetabolic symptoms reported  Respiratory ROS: no cough, shortness of breath, or wheezing  Cardiovascular ROS: no chest pain or dyspnea on exertion  Gastrointestinal ROS: As per HPI  Musculoskeletal ROS: no recent injuries reported  Neurological ROS: no focal neurologic defects reported.        /88   Ht 1.854 m (6' 1\")   Wt 110.6 kg (243 lb 12.8 oz)   BMI 32.17 kg/m    Body mass index is 32.17 kg/m .    EXAM:  General : Alert, cooperative, appears stated age   Skin: Skin color, texture, turgor normal, no rashes or lesions   Lymphatic: No obvious adenopathy, no swelling   Eyes: No scleral icterus, pupils equal  HENT: no traumatic injury to the head or face, no gross abnormalities  Lungs: Normal respiratory effort, breath sounds equal bilaterally  Heart: Regular rate and rhythm  Abdomen: small umbilical hernia containing fat; roughly 1 cm in size  Musculoskeletal: No obvious swelling,  Neurologic: Grossly intact      Assessment/Plan:    Arturo Mei is a 48 year old male with an umbilical hernia.  The pathophysiology of umbilical hernias was discussed as were the surgical and non-operative management strategies.      We discussed both open and laparoscopic approaches, and the respective risks and benefits of each approach.  We similarly discussed the role and specific risks associated with mesh placement and primary repair.  The risks of surgery in general were " discussed which include, but are not limited to, bleeding, infection, recurrent hernia, chronic pain, poor cosmesis, blood clots, stroke, heart attack and death.  Additionally, the risks of observation were discussed which include, but are not limited to, enlargement of the hernia, incarceration, strangulation, pain and death.      He understands everything which was discussed and has consented to proceed with an open umbilical hernia repair with mesh.  We will therefore schedule surgery accordingly.      Ar Saxena MD, FACS  Office: [phone]  [facility]   General and Bariatric Surgery

## 2023-08-03 ENCOUNTER — TELEPHONE (OUTPATIENT)
Dept: GASTROENTEROLOGY | Facility: CLINIC | Age: 48
End: 2023-08-03
Payer: COMMERCIAL

## 2023-08-03 ENCOUNTER — NURSE TRIAGE (OUTPATIENT)
Dept: NURSING | Facility: CLINIC | Age: 48
End: 2023-08-03
Payer: COMMERCIAL

## 2023-08-03 DIAGNOSIS — U07.1 INFECTION DUE TO 2019 NOVEL CORONAVIRUS: Primary | ICD-10-CM

## 2023-08-03 NOTE — TELEPHONE ENCOUNTER
"Endoscopy Scheduling Screen    Have you had a positive Covid test in the last 14 days?  No    Are you active on MyChart?   Yes    What insurance is in the chart?  BC/BS: Schedule in ASC unless patient meets exclusion criteria.     Ordering/Referring Provider:   JAYLAN ALEXANDER        (If ordering provider performs procedure, schedule with ordering provider unless otherwise instructed. )    BMI: Estimated body mass index is 32.17 kg/m  as calculated from the following:    Height as of 7/28/23: 1.854 m (6' 1\").    Weight as of 7/28/23: 110.6 kg (243 lb 12.8 oz).     Sedation Ordered  moderate sedation.   If patient BMI > 50 do not schedule in ASC.    Are you taking any prescription medications for pain?   No    Are you taking methadone or Suboxone?  No    Do you have a history of malignant hyperthermia or adverse reaction to anesthesia?  No    (Females) Are you currently pregnant?   No     Have you been diagnosed or told you have pulmonary hypertension?   No    Do you have an LVAD?  No    Have you been told you have moderate to severe sleep apnea?  Yes (RN Review required for scheduling unless scheduling in Hospital.)    Have you been told you have COPD, asthma, or any other lung disease?  No    Do you have any heart conditions?  No     Have you ever had or are you awaiting a heart or lung transplant?   No    Have you had a stroke or transient ischemic attack (TIA aka \"mini stroke\" in the last 6 months?   No    Have you been diagnosed with or been told you have cirrhosis of the liver?   No    Are you currently on dialysis?   No    Do you need assistance transferring?   No    BMI: Estimated body mass index is 32.17 kg/m  as calculated from the following:    Height as of 7/28/23: 1.854 m (6' 1\").    Weight as of 7/28/23: 110.6 kg (243 lb 12.8 oz).     Is patients BMI > 40 and scheduling location [facility]?  No    Do you take the medication Phentermine, Ozempic or Wegovy?  Yes Recommended hold time is 7 days before your " procedure. Please contact your prescribing provider to discuss.    Do you take the medication Naltrexone?  No    Do you take blood thinners?  No      Prep   Are you currently on dialysis or do you have chronic kidney disease?  No    Do you have a diagnosis of diabetes?  Yes (Golytely Prep)    Do you have a diagnosis of cystic fibrosis (CF)?  No    On a regular basis do you go 3 -5 days between bowel movements?  No    BMI > 40?  No    Preferred Pharmacy:    [id number] - [place] [address] AT [address]  [address]  [facility] [address]  Phone: [phone] Fax: [phone]      Final Scheduling Details   Colonoscopy prep sent?  Standard Golytely    Procedure scheduled  Colonoscopy    Surgeon:  Kimberly     Date of procedure:  10/13     Schedule PAC:   No    Location  [facility]    Sedation   Moderate Sedation    Patient Reminders:   You will receive a call from a Nurse to review instructions and health history.  This assessment must be completed prior to your procedure.  Failure to complete the Nurse assessment may result in the procedure being cancelled.      On the day of your procedure, please designate an adult(s) who can drive you home stay with you for the next 24 hours. The medicines used in the exam will make you sleepy. You will not be able to drive.      You cannot take public transportation, ride share services, or non-medical taxi service without a responsible caregiver.  Medical transport services are allowed with the requirement that a responsible caregiver will receive you at your destination.  We require that drivers and caregivers are confirmed prior to your procedure.

## 2023-08-04 NOTE — TELEPHONE ENCOUNTER
RN COVID TREATMENT VISIT  08/04/23      The patient has been triaged and does not require a higher level of care.    Arturo Mei  48 year old  Current weight? 235    Has the patient been seen by a primary care provider at an [facility] or [facility] within the past two years? Yes.   Have you been in close proximity to/do you have a known exposure to a person with a confirmed case of influenza? No.     General treatment eligibility:  Date of positive COVID test (PCR or at home)?  8/3/23    Are you or have you been hospitalized for this COVID-19 infection? No.   Have you received monoclonal antibodies or antiviral treatment for COVID-19 since this positive test? No.   Do you have any of the following conditions that place you at risk of being very sick from COVID-19?   - Diabetes mellitus, type 1 and type 2  - Overweight or Obesity (BMI >85th percentile or BMI 25 or higher)  Yes, patient has at least one high risk condition as noted above.     Current COVID symptoms:   - fever or chills  - cough  - muscle or body aches  - congestion or runny nose  Yes. Patient has at least one symptom as selected.     How many days since symptoms started? 5 days or less. Established patient, 12 years or older weighing at least 88.2 lbs, who has symptoms that started in the past 5 days, has not been hospitalized nor received treatment already, and is at risk for being very sick from COVID-19.     Treatment eligibility by RN:  Are you currently pregnant or nursing? No  Do you have a clinically significant hypersensitivity to nirmatrelvir or ritonavir, or toxic epidermal necrolysis (TEN) or Hayes-Harlan Syndrome? No  Do you have a history of hepatitis, any hepatic impairment on the Problem List (such as Child-John Class C, cirrhosis, fatty liver disease, alcoholic liver disease), or was the last liver lab (hepatic panel, ALT, AST, ALK Phos, bilirubin) elevated in the past 6 months? No  Do you have  any history of severe renal impairment (eGFR < 30mL/min)? No    Is patient eligible to continue? Yes, patient meets all eligibility requirements for the RN COVID treatment (as denoted by all no responses above).     Current Outpatient Medications   Medication Sig Dispense Refill    atorvastatin (LIPITOR) 20 MG tablet Take 1 tablet (20 mg) by mouth daily 90 tablet 0    blood glucose (NO BRAND SPECIFIED) test strip Use to test blood sugar 2 times daily. For use with one touch ultra 2 meter. 200 strip 3    fish oil-omega-3 fatty acids 1000 MG capsule Take 1,000 mg by mouth daily      metFORMIN (GLUCOPHAGE XR) 500 MG 24 hr tablet Take 4 tablets (2,000 mg) by mouth daily 360 tablet 0    semaglutide (OZEMPIC) 2 MG/1.5ML SOPN pen Inject 0.5 mg Subcutaneous every 7 days (Patient not taking: Reported on 7/28/2023) 3 mL 11    Semaglutide, 1 MG/DOSE, (OZEMPIC) 4 MG/3ML pen Inject 1 mg Subcutaneous every 7 days 3 mL 0       Medications from List 1 of the standing order (on medications that exclude the use of Paxlovid) that patient is taking: NONE. Is patient taking [place] Wort? No  Is patient taking [place] Wort or any meds from List 1? No.   Medications from List 2 of the standing order (on meds that provider needs to adjust) that patient is taking: NONE. Is patient on any of the meds from List 2? No.   Medications from List 3 of standing order (on meds that a RN needs to adjust) that patient is taking: atorvastatin (Lipitor): Instructed patient to stop atorvastatin while taking Paxlovid and restart atorvastatin 1 day after the completion of Paxlovid.  Is patient on any meds from List 3? Yes. Patient is on meds from list 3. No meds require a provider visit and at least one med required RN to adjust.     Paxlovid has an approximate 90% reduction in hospitalization. Paxlovid can possibly cause altered sense of taste, diarrhea (loose, watery stools), high blood pressure, muscle aches.     Would patient like a Paxlovid  prescription?   Yes.   Lab Results   Component Value Date    GFRESTIMATED >90 07/10/2023       Was last eGFR reduced? No, eGFR 60 or greater/ No Result on record. Patient can receive the normal renal function dose. Paxlovid Rx sent to [place] [facility]   [facility]ov[facility] and [facility]     Temporary change to home medications: atorvastatin (Lipitor): Instructed patient to stop atorvastatin while taking Paxlovid and restart atorvastatin 1 day after the completion of Paxlovid.     All medication adjustments (holds, etc) were discussed with the patient and patient was asked to repeat back (teachback) their med adjustment.  Did patient understand med adjustment? Yes, patient repeated back and understood correctly.        Reviewed the following instructions with the patient:    Paxlovid (nimatrelvir and ritonavir)    How it works  Two medicines (nirmatrelvir and ritonavir) are taken together. They stop the virus from growing. Less amount of virus is easier for your body to fight.    How to take  Medicine comes in a daily container with both medicine tablets. Take by mouth twice daily (once in the morning, once at night) for 5 days.  The number of tablets to take varies by patient.  Don't chew or break capsules. Swallow whole.    When to take  Take as soon as possible after positive COVID-19 test result, and within 5 days of your first symptoms.    Possible side effects  Can cause altered sense of taste, diarrhea (loose, watery stools), high blood pressure, muscle aches.    Carolyne Ibarra RN

## 2023-08-04 NOTE — TELEPHONE ENCOUNTER
COVID Positive/Requesting COVID treatment    Patient is positive for COVID and requesting treatment options.    Date of positive COVID test (PCR or at home)? 8/3/23  Current COVID symptoms: fever or chills, cough, fatigue, muscle or body aches, headache, and congestion or runny nose  Date COVID symptoms began: 8/2/23    Message should be routed to clinic RN pool. Best phone number to use for call back: [phone]. Okay to leave message.     Reason for Disposition   [1] HIGH RISK for severe COVID complications (e.g., weak immune system, age > 64 years, obesity with BMI of 30 or higher, pregnant, chronic lung disease or other chronic medical condition) AND [2] COVID symptoms (e.g., cough, fever)  (Exceptions: Already seen by PCP and no new or worsening symptoms.)    Additional Information   Negative: SEVERE difficulty breathing (e.g., struggling for each breath, speaks in single words)   Negative: Difficult to awaken or acting confused (e.g., disoriented, slurred speech)   Negative: Sounds like a life-threatening emergency to the triager   Negative: Bluish (or gray) lips or face now   Negative: Shock suspected (e.g., cold/pale/clammy skin, too weak to stand, low BP, rapid pulse)   Negative: SEVERE or constant chest pain or pressure  (Exception: Mild central chest pain, present only when coughing.)   Negative: MODERATE difficulty breathing (e.g., speaks in phrases, SOB even at rest, pulse 100-120)   Negative: Chest pain or pressure  (Exception: MILD central chest pain, present only when coughing)   Negative: Patient sounds very sick or weak to the triager   Negative: MILD difficulty breathing (e.g., minimal/no SOB at rest, SOB with walking, pulse <100)    Protocols used: Coronavirus (COVID-19) Diagnosed or [id number] Echevarria RN  [facility] Nurse Advisor   8/3/2023  8:13 PM

## 2023-08-10 DIAGNOSIS — E11.9 TYPE 2 DIABETES, HBA1C GOAL < 8% (H): ICD-10-CM

## 2023-08-10 RX ORDER — METFORMIN HCL 500 MG
2000 TABLET, EXTENDED RELEASE 24 HR ORAL DAILY
Qty: 360 TABLET | Refills: 1 | Status: SHIPPED | OUTPATIENT
Start: 2023-08-10 | End: 2023-08-18

## 2023-08-10 NOTE — TELEPHONE ENCOUNTER
"Last Written Prescription Date:  4/18/2023  Last Fill Quantity: 360,  # refills: 0   Last office visit provider:  7/20/2023     Requested Prescriptions   Pending Prescriptions Disp Refills    metFORMIN (GLUCOPHAGE XR) 500 MG 24 hr tablet 360 tablet 0     Sig: Take 4 tablets (2,000 mg) by mouth daily       Biguanide Agents Passed - 8/10/2023  2:01 PM        Passed - Patient is age 10 or older        Passed - Patient has documented A1c within the specified period of time.     If HgbA1C is 8 or greater, it needs to be on file within the past 3 months.  If less than 8, must be on file within the past 6 months.     Recent Labs   Lab Test 07/10/23  0838   A1C 7.7*             Passed - Patient's CR is NOT>1.4 OR Patient's EGFR is NOT<45 within past 12 mos.     Recent Labs   Lab Test 07/10/23  0838 11/05/21  0740 12/14/20  1508   GFRESTIMATED >90   < > >60   GFRESTBLACK  --   --  >60    < > = values in this interval not displayed.       Recent Labs   Lab Test 07/10/23  0838   CR 0.97             Passed - Patient does NOT have a diagnosis of CHF.        Passed - Medication is active on med list        Passed - Recent (6 mo) or future (30 days) visit within the authorizing provider's specialty     Patient had office visit in the last 6 months or has a visit in the next 30 days with authorizing provider or within the authorizing provider's specialty.  See \"Patient Info\" tab in inbasket, or \"Choose Columns\" in Meds & Orders section of the refill encounter.                 MANUEL JENKINS RN 08/10/23 2:01 PM  "

## 2023-08-18 ENCOUNTER — OFFICE VISIT (OUTPATIENT)
Dept: PEDIATRICS | Facility: CLINIC | Age: 48
End: 2023-08-18
Payer: COMMERCIAL

## 2023-08-18 VITALS
DIASTOLIC BLOOD PRESSURE: 85 MMHG | WEIGHT: 240.8 LBS | SYSTOLIC BLOOD PRESSURE: 126 MMHG | HEART RATE: 80 BPM | BODY MASS INDEX: 30.9 KG/M2 | OXYGEN SATURATION: 96 % | HEIGHT: 74 IN | TEMPERATURE: 97.2 F | RESPIRATION RATE: 16 BRPM

## 2023-08-18 DIAGNOSIS — K76.0 HEPATIC STEATOSIS: ICD-10-CM

## 2023-08-18 DIAGNOSIS — E78.5 HYPERLIPIDEMIA LDL GOAL <100: ICD-10-CM

## 2023-08-18 DIAGNOSIS — E11.69 TYPE 2 DIABETES MELLITUS WITH OTHER SPECIFIED COMPLICATION, WITHOUT LONG-TERM CURRENT USE OF INSULIN (H): ICD-10-CM

## 2023-08-18 DIAGNOSIS — Z12.11 SCREEN FOR COLON CANCER: Primary | ICD-10-CM

## 2023-08-18 DIAGNOSIS — Z11.4 SCREENING FOR HIV (HUMAN IMMUNODEFICIENCY VIRUS): ICD-10-CM

## 2023-08-18 PROBLEM — E11.9 TYPE 2 DIABETES, HBA1C GOAL < 8% (H): Status: RESOLVED | Noted: 2017-12-06 | Resolved: 2023-08-18

## 2023-08-18 LAB
CREAT UR-MCNC: 145 MG/DL
MICROALBUMIN UR-MCNC: 13.7 MG/L
MICROALBUMIN/CREAT UR: 9.45 MG/G CR (ref 0–17)

## 2023-08-18 PROCEDURE — 90677 PCV20 VACCINE IM: CPT | Performed by: INTERNAL MEDICINE

## 2023-08-18 PROCEDURE — 90471 IMMUNIZATION ADMIN: CPT | Performed by: INTERNAL MEDICINE

## 2023-08-18 PROCEDURE — 82570 ASSAY OF URINE CREATININE: CPT | Performed by: INTERNAL MEDICINE

## 2023-08-18 PROCEDURE — 99214 OFFICE O/P EST MOD 30 MIN: CPT | Mod: 25 | Performed by: INTERNAL MEDICINE

## 2023-08-18 PROCEDURE — 82043 UR ALBUMIN QUANTITATIVE: CPT | Performed by: INTERNAL MEDICINE

## 2023-08-18 RX ORDER — METFORMIN HCL 500 MG
2000 TABLET, EXTENDED RELEASE 24 HR ORAL DAILY
Qty: 360 TABLET | Refills: 3 | Status: SHIPPED | OUTPATIENT
Start: 2023-08-18

## 2023-08-18 RX ORDER — ATORVASTATIN CALCIUM 20 MG/1
20 TABLET, FILM COATED ORAL DAILY
Qty: 90 TABLET | Refills: 3 | Status: SHIPPED | OUTPATIENT
Start: 2023-08-18 | End: 2024-08-06

## 2023-08-18 ASSESSMENT — PAIN SCALES - GENERAL: PAINLEVEL: NO PAIN (0)

## 2023-08-18 NOTE — PROGRESS NOTES
SUBJECTIVE:   CC: Arturo is an 48 year old who presents for preventative health visit.       8/18/2023     9:08 AM   Additional Questions   Roomed by jessi Zuleta   Accompanied by n/a         8/18/2023     9:08 AM   Patient Reported Additional Medications   Patient reports taking the following new medications n/a       Healthy Habits:     Taking medications regularly:  0  History of Present Illness       Reason for visit:  Establish care with new doctor.    He eats 2-3 servings of fruits and vegetables daily.He consumes 0 sweetened beverage(s) daily.He exercises with enough effort to increase his heart rate 10 to 19 minutes per day.  He exercises with enough effort to increase his heart rate 4 days per week.   He is taking medications regularly.    Metfomrin 1000 mg and ozempic 1 mg weekly; just started this higher dose.     Some muscle achiness at times.    Desk job.  Can get 2-3 days per week of exercise; 30-45 min.  No regular routine for exercise.      Has umbilcal surgery coming up.  End of Sept.     Today's PHQ-2 Score:       8/17/2023     9:23 AM   PHQ-2 ( 1999 Pfizer)   Q1: Little interest or pleasure in doing things 0   Q2: Feeling down, depressed or hopeless 0   PHQ-2 Score 0   Q1: Little interest or pleasure in doing things Not at all   Q2: Feeling down, depressed or hopeless Not at all   PHQ-2 Score 0                     Social History     Tobacco Use    Smoking status: Never    Smokeless tobacco: Never   Substance Use Topics    Alcohol use: Yes     Alcohol/week: 0.0 - 16.7 standard drinks of alcohol     Comment: Alcoholic Drinks/day: cut back 2017 11/6/2021     8:10 AM   Alcohol Use   Prescreen: >3 drinks/day or >7 drinks/week? No       Last PSA: No results found for: PSA    Reviewed orders with patient. Reviewed health maintenance and updated orders accordingly - Yes  Lab work is in process  Labs reviewed in EPIC    Reviewed and updated as needed this visit by clinical staff   Tobacco   "Allergies  Meds              Reviewed and updated as needed this visit by Provider                     Review of Systems  CONSTITUTIONAL: NEGATIVE for fever, chills, change in weight  INTEGUMENTARY/SKIN: NEGATIVE for worrisome rashes, moles or lesions  EYES: NEGATIVE for vision changes or irritation  ENT: NEGATIVE for ear, mouth and throat problems  RESP: NEGATIVE for significant cough or SOB  CV: NEGATIVE for chest pain, palpitations or peripheral edema  GI: NEGATIVE for nausea, abdominal pain, heartburn, or change in bowel habits   male: negative for dysuria, hematuria, decreased urinary stream, erectile dysfunction, urethral discharge  MUSCULOSKELETAL: NEGATIVE for significant arthralgias or myalgia  NEURO: NEGATIVE for weakness, dizziness or paresthesias  PSYCHIATRIC: NEGATIVE for changes in mood or affect    OBJECTIVE:   /85 (BP Location: Right arm, Patient Position: Sitting, Cuff Size: Adult Large)   Pulse 80   Temp 97.2  F (36.2  C) (Temporal)   Resp 16   Ht 1.867 m (6' 1.5\")   Wt 109.2 kg (240 lb 12.8 oz)   SpO2 96%   BMI 31.34 kg/m      Physical Exam  GENERAL: healthy, alert and no distress  EYES: Eyes grossly normal to inspection, PERRL and conjunctivae and sclerae normal  HENT: ear canals and TM's normal, nose and mouth without ulcers or lesions  NECK: no adenopathy, no asymmetry, masses, or scars and thyroid normal to palpation  RESP: lungs clear to auscultation - no rales, rhonchi or wheezes  CV: regular rate and rhythm, normal S1 S2, no S3 or S4, no murmur, click or rub, no peripheral edema and peripheral pulses strong  ABDOMEN: soft, nontender, no hepatosplenomegaly, no masses and bowel sounds normal  MS: no gross musculoskeletal defects noted, no edema  SKIN: no suspicious lesions or rashes  NEURO: Normal strength and tone, mentation intact and speech normal  PSYCH: mentation appears normal, affect normal/bright  Diabetic foot exam: normal DP and PT pulses, no trophic changes or " "ulcerative lesions, and normal sensory exam        ASSESSMENT/PLAN:   (Z12.11) Screen for colon cancer  (primary encounter diagnosis)  Comment:   Plan: scheduling for this fall.     (Z11.4) Screening for HIV (human immunodeficiency virus)  Comment:   Plan:     (E11.69) Type 2 diabetes mellitus with other specified complication, without long-term current use of insulin (H)  Comment:   Plan: Albumin Random Urine Quantitative with Creat         Ratio, blood glucose (NO BRAND SPECIFIED) test         strip, metFORMIN (GLUCOPHAGE XR) 500 MG 24 hr         tablet, CANCELED: **Hemoglobin A1c FUTURE 3mo        Parameters well controlled.  Continue secondary risk factor modification for BP, cholesterol, anticoagulation, and smoking cessation.     (K76.0) Hepatic steatosis  Comment:   Plan: stable liver function tests.     (E78.5) Hyperlipidemia LDL goal <100  Comment:   Plan: atorvastatin (LIPITOR) 20 MG tablet        Tolerating statin.    Patient has been advised of split billing requirements and indicates understanding: Yes      COUNSELING:   Reviewed preventive health counseling, as reflected in patient instructions       Regular exercise       Healthy diet/nutrition       Vision screening       Colorectal cancer screening       Prostate cancer screening      BMI:   Estimated body mass index is 31.34 kg/m  as calculated from the following:    Height as of this encounter: 1.867 m (6' 1.5\").    Weight as of this encounter: 109.2 kg (240 lb 12.8 oz).   Weight management plan: Patient was referred to their PCP to discuss a diet and exercise plan.      He reports that he has never smoked. He has never used smokeless tobacco.            Bryn Redd MD  [facility]  "

## 2023-08-18 NOTE — PATIENT INSTRUCTIONS
Get your flu and COVID boosters this fall (1-2 months)    Today:  Prevnar 20 shot.  Urinalysis.    Set up complete physcial exam in 6 months.

## 2023-08-22 ENCOUNTER — MYC MEDICAL ADVICE (OUTPATIENT)
Dept: PEDIATRICS | Facility: CLINIC | Age: 48
End: 2023-08-22
Payer: COMMERCIAL

## 2023-08-22 ENCOUNTER — TRANSFERRED RECORDS (OUTPATIENT)
Dept: HEALTH INFORMATION MANAGEMENT | Facility: CLINIC | Age: 48
End: 2023-08-22
Payer: COMMERCIAL

## 2023-08-22 DIAGNOSIS — E11.69 TYPE 2 DIABETES MELLITUS WITH OTHER SPECIFIED COMPLICATION, WITHOUT LONG-TERM CURRENT USE OF INSULIN (H): Primary | ICD-10-CM

## 2023-08-22 RX ORDER — LANCETS 33 GAUGE
1 EACH MISCELLANEOUS 2 TIMES DAILY
Qty: 100 EACH | Refills: 11 | Status: SHIPPED | OUTPATIENT
Start: 2023-08-22

## 2023-08-22 NOTE — TELEPHONE ENCOUNTER
Routing refill request to provider for review/approval because:  Drug not active on patient's medication list    Fozia ESTRADA RN   [facility]

## 2023-08-22 NOTE — TELEPHONE ENCOUNTER
See patient's MyChart message   - Patient requesting a prescription for OneTouch Ultra II meter lancets     Routing to [facility]Patient Care pool, to please assist in pending the correct lancets for the patient.     Fozia ESTRADA RN   [facility]

## 2023-09-07 DIAGNOSIS — E11.9 TYPE 2 DIABETES, HBA1C GOAL < 8% (H): ICD-10-CM

## 2023-09-07 NOTE — TELEPHONE ENCOUNTER
Pt just established care with Dr. Redd, so he would like him to start refilling the Ozempic    Thank you  Caty Rapp RN on 9/7/2023 at 8:20 AM

## 2023-09-08 NOTE — TELEPHONE ENCOUNTER
Routing refill request to provider for review/approval because:  New for Dr Redd, see pt message, asking for you to take over  Cate Elmore RN, BSN  [facility]

## 2023-09-26 ENCOUNTER — ANESTHESIA EVENT (OUTPATIENT)
Dept: SURGERY | Facility: AMBULATORY SURGERY CENTER | Age: 48
End: 2023-09-26
Payer: COMMERCIAL

## 2023-09-27 ENCOUNTER — HOSPITAL ENCOUNTER (OUTPATIENT)
Facility: AMBULATORY SURGERY CENTER | Age: 48
Discharge: HOME | End: 2023-09-27
Attending: SURGERY
Payer: COMMERCIAL

## 2023-09-27 ENCOUNTER — ANESTHESIA (OUTPATIENT)
Dept: SURGERY | Facility: AMBULATORY SURGERY CENTER | Age: 48
End: 2023-09-27
Payer: COMMERCIAL

## 2023-09-27 VITALS
RESPIRATION RATE: 16 BRPM | WEIGHT: 235 LBS | DIASTOLIC BLOOD PRESSURE: 92 MMHG | BODY MASS INDEX: 30.16 KG/M2 | SYSTOLIC BLOOD PRESSURE: 131 MMHG | OXYGEN SATURATION: 96 % | HEART RATE: 74 BPM | HEIGHT: 74 IN | TEMPERATURE: 96.8 F

## 2023-09-27 DIAGNOSIS — K42.9 UMBILICAL HERNIA WITHOUT OBSTRUCTION AND WITHOUT GANGRENE: ICD-10-CM

## 2023-09-27 LAB — GLUCOSE BY METER: 171

## 2023-09-27 PROCEDURE — 49591 RPR AA HRN 1ST < 3 CM RDC: CPT | Performed by: SURGERY

## 2023-09-27 DEVICE — PATCH, HERNIA, SMALL, CIRCLE WITH STRAP, VENTRALEX ST, 1.7IN (4.3CM) DIAMETER 5950007: Type: IMPLANTABLE DEVICE | Site: UMBILICAL | Status: FUNCTIONAL

## 2023-09-27 RX ORDER — ONDANSETRON 2 MG/ML
4 INJECTION INTRAMUSCULAR; INTRAVENOUS EVERY 30 MIN PRN
Status: DISCONTINUED | OUTPATIENT
Start: 2023-09-27 | End: 2023-09-28 | Stop reason: HOSPADM

## 2023-09-27 RX ORDER — FENTANYL CITRATE 0.05 MG/ML
25 INJECTION, SOLUTION INTRAMUSCULAR; INTRAVENOUS EVERY 5 MIN PRN
Status: DISCONTINUED | OUTPATIENT
Start: 2023-09-27 | End: 2023-09-28 | Stop reason: HOSPADM

## 2023-09-27 RX ORDER — HYDROMORPHONE HCL IN WATER/PF 6 MG/30 ML
0.2 PATIENT CONTROLLED ANALGESIA SYRINGE INTRAVENOUS EVERY 5 MIN PRN
Status: DISCONTINUED | OUTPATIENT
Start: 2023-09-27 | End: 2023-09-28 | Stop reason: HOSPADM

## 2023-09-27 RX ORDER — OXYCODONE HYDROCHLORIDE 5 MG/1
5 TABLET ORAL
Status: DISCONTINUED | OUTPATIENT
Start: 2023-09-27 | End: 2023-09-28 | Stop reason: HOSPADM

## 2023-09-27 RX ORDER — LIDOCAINE 40 MG/G
CREAM TOPICAL
Status: DISCONTINUED | OUTPATIENT
Start: 2023-09-27 | End: 2023-09-28 | Stop reason: HOSPADM

## 2023-09-27 RX ORDER — LIDOCAINE HYDROCHLORIDE 20 MG/ML
INJECTION, SOLUTION INFILTRATION; PERINEURAL PRN
Status: DISCONTINUED | OUTPATIENT
Start: 2023-09-27 | End: 2023-09-27

## 2023-09-27 RX ORDER — ONDANSETRON 4 MG/1
4 TABLET, ORALLY DISINTEGRATING ORAL EVERY 30 MIN PRN
Status: DISCONTINUED | OUTPATIENT
Start: 2023-09-27 | End: 2023-09-28 | Stop reason: HOSPADM

## 2023-09-27 RX ORDER — TRAMADOL HYDROCHLORIDE 50 MG/1
50 TABLET ORAL EVERY 6 HOURS PRN
Qty: 10 TABLET | Refills: 0 | Status: SHIPPED | OUTPATIENT
Start: 2023-09-27 | End: 2023-09-30

## 2023-09-27 RX ORDER — FENTANYL CITRATE 50 UG/ML
INJECTION, SOLUTION INTRAMUSCULAR; INTRAVENOUS PRN
Status: DISCONTINUED | OUTPATIENT
Start: 2023-09-27 | End: 2023-09-27

## 2023-09-27 RX ORDER — SODIUM CHLORIDE, SODIUM LACTATE, POTASSIUM CHLORIDE, CALCIUM CHLORIDE 600; 310; 30; 20 MG/100ML; MG/100ML; MG/100ML; MG/100ML
INJECTION, SOLUTION INTRAVENOUS CONTINUOUS
Status: DISCONTINUED | OUTPATIENT
Start: 2023-09-27 | End: 2023-09-28 | Stop reason: HOSPADM

## 2023-09-27 RX ORDER — FENTANYL CITRATE 0.05 MG/ML
50 INJECTION, SOLUTION INTRAMUSCULAR; INTRAVENOUS EVERY 5 MIN PRN
Status: DISCONTINUED | OUTPATIENT
Start: 2023-09-27 | End: 2023-09-28 | Stop reason: HOSPADM

## 2023-09-27 RX ORDER — ACETAMINOPHEN 325 MG/1
975 TABLET ORAL ONCE
Status: COMPLETED | OUTPATIENT
Start: 2023-09-27 | End: 2023-09-27

## 2023-09-27 RX ORDER — ONDANSETRON 2 MG/ML
INJECTION INTRAMUSCULAR; INTRAVENOUS PRN
Status: DISCONTINUED | OUTPATIENT
Start: 2023-09-27 | End: 2023-09-27

## 2023-09-27 RX ORDER — DEXAMETHASONE SODIUM PHOSPHATE 10 MG/ML
INJECTION, SOLUTION INTRAMUSCULAR; INTRAVENOUS PRN
Status: DISCONTINUED | OUTPATIENT
Start: 2023-09-27 | End: 2023-09-27

## 2023-09-27 RX ORDER — KETOROLAC TROMETHAMINE 30 MG/ML
INJECTION, SOLUTION INTRAMUSCULAR; INTRAVENOUS PRN
Status: DISCONTINUED | OUTPATIENT
Start: 2023-09-27 | End: 2023-09-27

## 2023-09-27 RX ORDER — CEFAZOLIN SODIUM 2 G/100ML
2 INJECTION, SOLUTION INTRAVENOUS
Status: COMPLETED | OUTPATIENT
Start: 2023-09-27 | End: 2023-09-27

## 2023-09-27 RX ORDER — MEPERIDINE HYDROCHLORIDE 25 MG/ML
12.5 INJECTION INTRAMUSCULAR; INTRAVENOUS; SUBCUTANEOUS EVERY 5 MIN PRN
Status: DISCONTINUED | OUTPATIENT
Start: 2023-09-27 | End: 2023-09-28 | Stop reason: HOSPADM

## 2023-09-27 RX ORDER — PROPOFOL 10 MG/ML
INJECTION, EMULSION INTRAVENOUS CONTINUOUS PRN
Status: DISCONTINUED | OUTPATIENT
Start: 2023-09-27 | End: 2023-09-27

## 2023-09-27 RX ORDER — GLYCOPYRROLATE 0.2 MG/ML
INJECTION, SOLUTION INTRAMUSCULAR; INTRAVENOUS PRN
Status: DISCONTINUED | OUTPATIENT
Start: 2023-09-27 | End: 2023-09-27

## 2023-09-27 RX ORDER — PROPOFOL 10 MG/ML
INJECTION, EMULSION INTRAVENOUS PRN
Status: DISCONTINUED | OUTPATIENT
Start: 2023-09-27 | End: 2023-09-27

## 2023-09-27 RX ORDER — HYDROMORPHONE HCL IN WATER/PF 6 MG/30 ML
0.4 PATIENT CONTROLLED ANALGESIA SYRINGE INTRAVENOUS EVERY 5 MIN PRN
Status: DISCONTINUED | OUTPATIENT
Start: 2023-09-27 | End: 2023-09-28 | Stop reason: HOSPADM

## 2023-09-27 RX ORDER — OXYCODONE HYDROCHLORIDE 10 MG/1
10 TABLET ORAL
Status: DISCONTINUED | OUTPATIENT
Start: 2023-09-27 | End: 2023-09-28 | Stop reason: HOSPADM

## 2023-09-27 RX ORDER — KETOROLAC TROMETHAMINE 15 MG/ML
15 INJECTION, SOLUTION INTRAMUSCULAR; INTRAVENOUS
Status: DISCONTINUED | OUTPATIENT
Start: 2023-09-27 | End: 2023-09-28 | Stop reason: HOSPADM

## 2023-09-27 RX ORDER — CEFAZOLIN SODIUM 2 G/100ML
2 INJECTION, SOLUTION INTRAVENOUS SEE ADMIN INSTRUCTIONS
Status: DISCONTINUED | OUTPATIENT
Start: 2023-09-27 | End: 2023-09-28 | Stop reason: HOSPADM

## 2023-09-27 RX ADMIN — KETOROLAC TROMETHAMINE 15 MG: 30 INJECTION, SOLUTION INTRAMUSCULAR; INTRAVENOUS at 12:50

## 2023-09-27 RX ADMIN — FENTANYL CITRATE 25 MCG: 50 INJECTION, SOLUTION INTRAMUSCULAR; INTRAVENOUS at 12:32

## 2023-09-27 RX ADMIN — PROPOFOL 200 MCG/KG/MIN: 10 INJECTION, EMULSION INTRAVENOUS at 12:16

## 2023-09-27 RX ADMIN — FENTANYL CITRATE 50 MCG: 50 INJECTION, SOLUTION INTRAMUSCULAR; INTRAVENOUS at 12:12

## 2023-09-27 RX ADMIN — LIDOCAINE HYDROCHLORIDE 3 ML: 20 INJECTION, SOLUTION INFILTRATION; PERINEURAL at 12:12

## 2023-09-27 RX ADMIN — SODIUM CHLORIDE, SODIUM LACTATE, POTASSIUM CHLORIDE, CALCIUM CHLORIDE: 600; 310; 30; 20 INJECTION, SOLUTION INTRAVENOUS at 11:27

## 2023-09-27 RX ADMIN — DEXAMETHASONE SODIUM PHOSPHATE 4 MG: 10 INJECTION, SOLUTION INTRAMUSCULAR; INTRAVENOUS at 12:14

## 2023-09-27 RX ADMIN — ONDANSETRON 4 MG: 2 INJECTION INTRAMUSCULAR; INTRAVENOUS at 12:13

## 2023-09-27 RX ADMIN — PROPOFOL 40 MG: 10 INJECTION, EMULSION INTRAVENOUS at 12:12

## 2023-09-27 RX ADMIN — CEFAZOLIN SODIUM 2 G: 2 INJECTION, SOLUTION INTRAVENOUS at 12:11

## 2023-09-27 RX ADMIN — GLYCOPYRROLATE 0.2 MG: 0.2 INJECTION, SOLUTION INTRAMUSCULAR; INTRAVENOUS at 12:13

## 2023-09-27 RX ADMIN — ACETAMINOPHEN 975 MG: 325 TABLET ORAL at 11:18

## 2023-09-27 NOTE — ANESTHESIA PREPROCEDURE EVALUATION
Anesthesia Pre-Procedure Evaluation    Patient: Arturo Mei   MRN: [id number] : 1975        Procedure : Procedure(s):  HERNIORRHAPHY, UMBILICAL, OPEN          Past Medical History:   Diagnosis Date    Hepatic steatosis     Hyperlipidemia LDL goal <130     Obstructive sleep apnea     Type 2 diabetes, HbA1C goal < 8% (H)     Umbilical hernia with obstruction 2023    Vitamin D deficiency       Past Surgical History:   Procedure Laterality Date    ARTHROSCOPY KNEE      Description: Arthroscopy Knee;  Recorded: 2013;  Comments: medial meniscus    SEPTOPLASTY        No Known Allergies   Social History     Tobacco Use    Smoking status: Never    Smokeless tobacco: Never   Substance Use Topics    Alcohol use: Yes     Alcohol/week: 0.0 - 16.7 standard drinks of alcohol     Comment: Alcoholic Drinks/day: cut back 2017, occassional      Wt Readings from Last 1 Encounters:   23 106.6 kg (235 lb)        Anesthesia Evaluation            ROS/MED HX  ENT/Pulmonary:  - neg pulmonary ROS   (+) sleep apnea, uses CPAP,                                     Neurologic:  - neg neurologic ROS     Cardiovascular:  - neg cardiovascular ROS     METS/Exercise Tolerance: >4 METS    Hematologic:  - neg hematologic  ROS     Musculoskeletal:  - neg musculoskeletal ROS     GI/Hepatic:  - neg GI/hepatic ROS     Renal/Genitourinary:  - neg Renal ROS     Endo:  - neg endo ROS   (+) type I DM,              Obesity,       Psychiatric/Substance Use:  - neg psychiatric ROS     Infectious Disease:  - neg infectious disease ROS     Malignancy:  - neg malignancy ROS     Other:  - neg other ROS          Physical Exam    Airway        Mallampati: III   TM distance: > 3 FB   Neck ROM: full   Mouth opening: > 3 cm    Respiratory Devices and Support         Dental       (+) Minor Abnormalities - some fillings, tiny chips      Cardiovascular   cardiovascular exam normal          Pulmonary   pulmonary exam normal                 OUTSIDE LABS:  CBC:   Lab Results   Component Value Date    WBC 4.5 07/10/2023    WBC 5.9 06/02/2022    HGB 14.4 07/10/2023    HGB 15.7 06/02/2022    HCT 42.8 07/10/2023    HCT 45.7 06/02/2022     07/10/2023     06/02/2022     BMP:   Lab Results   Component Value Date     07/10/2023     06/02/2022    POTASSIUM 4.7 07/10/2023    POTASSIUM 4.9 06/02/2022    CHLORIDE 102 07/10/2023    CHLORIDE 101 06/02/2022    CO2 25 07/10/2023    CO2 28 06/02/2022    BUN 14.8 07/10/2023    BUN 11 06/02/2022    CR 0.97 07/10/2023    CR 1.03 06/02/2022     (H) 07/10/2023     (H) 06/02/2022     COAGS:   Lab Results   Component Value Date    PTT 27 12/09/2019    INR 0.98 12/09/2021     POC: No results found for: BGM, HCG, HCGS  HEPATIC:   Lab Results   Component Value Date    ALBUMIN 4.7 07/10/2023    PROTTOTAL 7.1 07/10/2023    ALT 79 (H) 07/10/2023    AST 41 07/10/2023    ALKPHOS 78 07/10/2023    BILITOTAL 0.3 07/10/2023     OTHER:   Lab Results   Component Value Date    A1C 7.7 (H) 07/10/2023    PRICE 9.3 07/10/2023    LIPASE 105 12/09/2019       Anesthesia Plan    ASA Status:  2    NPO Status:  NPO Appropriate    Anesthesia Type: MAC.     - Reason for MAC: straight local not clinically adequate, immobility needed              Consents    Anesthesia Plan(s) and associated risks, benefits, and realistic alternatives discussed. Questions answered and patient/representative(s) expressed understanding.     - Discussed: Risks, Benefits and Alternatives for BOTH SEDATION and the PROCEDURE were discussed     - Discussed with:  Patient      - Extended Intubation/Ventilatory Support Discussed: No.      - Patient is DNR/DNI Status: No     Use of blood products discussed: No .     Postoperative Care       PONV prophylaxis: Ondansetron (or other 5HT-3)     Comments:                William Connelly MD

## 2023-09-27 NOTE — DISCHARGE INSTRUCTIONS
If you have any questions or concerns regarding your procedure, please contact Dr. Saxena, his office number is [phone].     You have received 975 mg of Acetaminophen (Tylenol) at 11:18am. Please do not take an additional dose of Tylenol until after 5:18pm.     Do not exceed 4,000 mg of acetaminophen during a 24 hour period and keep in mind that acetaminophen can also be found in many over-the-counter cold medications as well as narcotics that may be given for pain.      You received a medication called Toradol (a stronger IV ibuprofen) at 12:50pm. Do NOT take any Ibuprofen / Advil / Aleve / Naproxen or products containing Ibuprofen until 6:50pm or later.    For 24 hours after surgery    Get plenty of rest.  A responsible adult must stay with you for at least 24 hours after you leave the hospital.   Do not drive or use heavy equipment.  If you have weakness or tingling, don't drive or use heavy equipment until this feeling goes away.  Do not drink alcohol.  Avoid strenuous or risky activities.  Ask for help when climbing stairs.   You may feel lightheaded.  IF so, sit for a few minutes before standing.  Have someone help you get up.   If you have nausea (feel sick to your stomach): Drink only clear liquids such as apple juice, ginger ale, broth or 7-Up.  Rest may also help.  Be sure to drink enough fluids.  Move to a regular diet as you feel able.  You may have a slight fever. Call the doctor if your fever is over 100 F (37.7 C) (taken under the tongue) or lasts longer than 24 hours.  You may have a dry mouth, a sore throat, muscle aches or trouble sleeping.  These should go away after 24 hours.  Do not make important or legal decisions.   Call your doctor for any of the followin.  Signs of infection (fever, growing tenderness at the surgery site, a large amount of drainage or bleeding, severe pain, foul-smelling drainage, redness, swelling).    2. It has been over 8 to 10 hours since surgery and you  are still not able to urinate (pass water).    3.  Headache for over 24 hours.

## 2023-09-27 NOTE — ANESTHESIA CARE TRANSFER NOTE
Patient: Arturo Mei    Procedure: Procedure(s):  HERNIORRHAPHY, UMBILICAL, OPEN       Diagnosis: Umbilical hernia without obstruction and without gangrene [K42.9]  Diagnosis Additional Information: No value filed.    Anesthesia Type:   MAC     Note:    Oropharynx: oropharynx clear of all foreign objects and spontaneously breathing  Level of Consciousness: awake  Oxygen Supplementation: room air    Independent Airway: airway patency satisfactory and stable  Dentition: dentition unchanged  Vital Signs Stable: post-procedure vital signs reviewed and stable  Report to RN Given: handoff report given  Patient transferred to: Phase II    Handoff Report: Identifed the Patient, Identified the Reponsible Provider, Reviewed the pertinent medical history, Discussed the surgical course, Reviewed Intra-OP anesthesia mangement and issues during anesthesia, Set expectations for post-procedure period and Allowed opportunity for questions and acknowledgement of understanding      Vitals:  Vitals Value Taken Time   /92 09/27/23 1249   Temp 96.8  F (36  C) 09/27/23 1248   Pulse 75 09/27/23 1251   Resp 16 09/27/23 1248   SpO2 94 % 09/27/23 1251   Vitals shown include unvalidated device data.    Electronically Signed By: KEVIN Hollingsworth CRNA  September 27, 2023  12:55 PM

## 2023-09-27 NOTE — ANESTHESIA POSTPROCEDURE EVALUATION
Patient: Arturo Mei    Procedure: Procedure(s):  HERNIORRHAPHY, UMBILICAL, OPEN       Anesthesia Type:  MAC    Note:  Disposition: Outpatient   Postop Pain Control: Uneventful            Sign Out: Well controlled pain   PONV: No   Neuro/Psych: Uneventful            Sign Out: Acceptable/Baseline neuro status   Airway/Respiratory: Uneventful            Sign Out: Acceptable/Baseline resp. status   CV/Hemodynamics: Uneventful            Sign Out: Acceptable CV status; No obvious hypovolemia; No obvious fluid overload   Other NRE: NONE   DID A NON-ROUTINE EVENT OCCUR? No           Last vitals:  Vitals Value Taken Time   /92 09/27/23 1300   Temp 96.8  F (36  C) 09/27/23 1248   Pulse 67 09/27/23 1311   Resp 16 09/27/23 1300   SpO2 95 % 09/27/23 1311   Vitals shown include unvalidated device data.    Electronically Signed By: William Connelly MD  September 27, 2023  1:51 PM

## 2023-09-27 NOTE — H&P
"PRE PROCEDURE NOTE - H & P      Chief Complaint/Reason for Procedure:              Umbilical hernia    Current Outpatient Medications   Medication    ASPIRIN NOT PRESCRIBED (INTENTIONAL)    atorvastatin (LIPITOR) 20 MG tablet    fish oil-omega-3 fatty acids 1000 MG capsule    metFORMIN (GLUCOPHAGE XR) 500 MG 24 hr tablet    Semaglutide, 1 MG/DOSE, (OZEMPIC) 4 MG/3ML pen    blood glucose (NO BRAND SPECIFIED) test strip    OneTouch Delica Lancets 33G MISC     Current Facility-Administered Medications   Medication    ceFAZolin (ANCEF) 2 g in 100 mL D5W intermittent infusion    ceFAZolin (ANCEF) 2 g in 100 mL D5W intermittent infusion    lactated ringers infusion    lidocaine (LMX4) kit    lidocaine 1 % 0.1-1 mL    sodium chloride (PF) 0.9% PF flush 3 mL    sodium chloride (PF) 0.9% PF flush 3 mL        No Known Allergies    Past Medical History:   Diagnosis Date    Hepatic steatosis     Hyperlipidemia LDL goal <130     Obstructive sleep apnea     Type 2 diabetes, HbA1C goal < 8% (H)     Umbilical hernia with obstruction 07/20/2023    Vitamin D deficiency        Past Surgical History:   Procedure Laterality Date    ARTHROSCOPY KNEE      Description: Arthroscopy Knee;  Recorded: 05/29/2013;  Comments: medial meniscus    SEPTOPLASTY  2007       Pre-sedation assessment:            BP (!) 149/93   Pulse 66   Temp 96.8  F (36  C) (Temporal)   Resp 16   Ht 1.88 m (6' 2\")   Wt 106.6 kg (235 lb)   SpO2 99%   BMI 30.17 kg/m    General appearance: alert, appears stated age, and cooperative  Airway: No gross abnormalities appreciated that would increase risk of airway compromise.   Heart: Regular rate and rhythm  Lungs: Normal respiratory effort    ASA Classification: 2    Sedation Plan based on assessment: Moderate    Impression:  Patient deemed adequate candidate for moderate sedation         Plan:   Umbilical hernia      Ar Saxena MD  9/27/2023 12:08 PM  [phone]                              "

## 2023-09-27 NOTE — OP NOTE
[facility]  Operative Note    Pre-operative diagnosis: Umbilical hernia without obstruction and without gangrene [K42.9]   Post-operative diagnosis Umbilical hernia   Procedure: Procedure(s):  HERNIORRHAPHY, UMBILICAL, OPEN   Surgeon: Ar Saxena MD   Assistants(s): None   Anesthesia: Monitored anesthesia care    Estimated blood loss: 10 ml                Drains: {None   Specimens: None       Findings: None.   Complications: None.           Description of procedure:    The patient was brought to the operating room where after induction of monitored anesthetic care he was positioned with both arms out. HE@ was then prepped and draped in sterile fashion.  After procedural timeout, we began by injecting local anesthetic into the skin and soft tissue surrounding the umbilical hernia.  We then proceeded to make a curvilinear infraumbilical incision just below the umbilicus.  This dissection was then carried down to the underlying fascia where we then dissected circumferentially around the neck of the hernia at the level of the abdominal wall fascia.  Once this was circumferentially dissected, the umbilical hernia neck was divided electrocautery at the level of the fascia.  The fascial defect was roughly 1.5-2 cm in size.  The protruding preperitoneal fatty tissues were reduced back into the abdomen.  A 4.3 cm Ventrio ST mesh was selected for reinforcement of the hernia repair.  This was placed in a fascial subway position where was then affixed to the overlying fascia with interrupted 0 Prolene sutures.  The fascial defect was then closed over the mesh with figure-of-eight 0 Prolene sutures.   The skin was then closed with a running 4-0 Vicryl subcuticular stitch.       Ar Saxena MD, FACS  Office: [phone]  [facility]   General and Bariatric Surgery

## 2023-09-30 ENCOUNTER — TELEPHONE (OUTPATIENT)
Dept: GASTROENTEROLOGY | Facility: CLINIC | Age: 48
End: 2023-09-30
Payer: COMMERCIAL

## 2023-09-30 DIAGNOSIS — Z12.11 ENCOUNTER FOR SCREENING COLONOSCOPY: Primary | ICD-10-CM

## 2023-09-30 RX ORDER — BISACODYL 5 MG/1
TABLET, DELAYED RELEASE ORAL
Qty: 4 TABLET | Refills: 0 | Status: SHIPPED | OUTPATIENT
Start: 2023-09-30 | End: 2024-01-31

## 2023-09-30 NOTE — TELEPHONE ENCOUNTER
Attempted to contact patient in order to complete pre assessment questions.     No answer. Left message to return call to [phone] option 4      Hanna Ramos RN  Endoscopy Procedure Pre Assessment RN    --------------------------------------------------------------------------------------------    Recent open umbilical herniorrhaphy on 9/27/23 - Instruct patient to check with his surgeon prior to proceeding with colonoscopy. They may want him to delay colonoscopy. Also informed patient via [employer] message.    Procedure details:    Patient scheduled for Colonoscopy  on 10/13/23.     Arrival time: 0825. Procedure time 0925    Pre op exam needed? N/A    Facility location: [facility]; [address] [place] [address], [address] Floor, [place] [address]    Sedation type: Conscious sedation     Indication for procedure: Screening      Chart review:     Electronic implanted devices? No    Diabetic? Yes. See medication holding recommendations     Diabetic medication HOLDING recommendations: (if applicable)  Oral diabetic medications: Yes:  Metformin (glucophage): HOLD day of procedure.  Diabetic injectables: Yes- Ozempic (Semaglutide). Weekly dosing of medication.  Hold 7 days before procedure.  Follow up with managing provider.   Insulin: No      Medication review:    Anticoagulants? No    NSAIDS? No NSAID medications per patient's medication list.  RN will verify with pre-assessment call.    Other medication HOLDING recommendations:  N/A      Prep for procedure:     Bowel prep recommendation: Standard Golytely    Due to:  diabetes.     Prep instructions sent via [email or web address]     Bowel prep script sent to    [facility] #[id number] [place], [place] - [address] AT [facility] [employer]        Chayo Thomson RN  Endoscopy Procedure Pre Assessment RN  [phone] option 4

## 2023-10-02 NOTE — TELEPHONE ENCOUNTER
Pre assessment completed for upcoming procedure.   (Please see previous telephone encounter notes for complete details)    Patient  returned call.       Procedure details:    Arrival time and facility location reviewed.    Pre op exam needed? N/A    Designated  policy reviewed. Instructed to have someone stay 6 hours post procedure.     COVID policy reviewed.      Medication review:    Medications reviewed. Please see supporting documentation below. Holding recommendations discussed (if applicable).   Oral diabetic medication(s): Metformin (glucophage): HOLD day of procedure.  Injectable diabetic medication(s): Ozempic (Semaglutide). Weekly dosing of medication.  Hold 7 days before procedure.  Follow up with managing provider.       Prep for procedure:     Procedure prep instructions reviewed.        Additional information needed?  Patient is waiting to hear back from his surgeon but will let us know as soon as possible if he needs to rescheduled for a later date.       Patient  verbalized understanding and had no questions or concerns at this time.      Hanna Greer RN  Endoscopy Procedure Pre Assessment RN  [phone] option 4

## 2023-10-06 ENCOUNTER — TELEPHONE (OUTPATIENT)
Dept: GASTROENTEROLOGY | Facility: CLINIC | Age: 48
End: 2023-10-06
Payer: COMMERCIAL

## 2023-10-06 NOTE — TELEPHONE ENCOUNTER
Caller: patient  Reason for Reschedule/Cancellation (please be detailed, any staff messages or encounters to note?): patient surgery. Needs recovery time.       Prior to reschedule please review:  Ordering Provider: Slick Polk  Sedation per order: moderate  Does patient have any ASC Exclusions, please identify?: yes sleep apnea      Notes on Cancelled Procedure:  Procedure: Lower Endoscopy [Colonoscopy]   Date: 10/13/23  Location: [facility]; [address], [address] [place] [address]  Surgeon: Kimberly      Rescheduled: Yes  Procedure: Lower Endoscopy [Colonoscopy]   Date: 01/05/24  Location: [facility]; [address], [address] [place] [address]  Surgeon: Kimberly  Sedation Level Scheduled  moderate,  Reason for Sedation Level per order  Prep/Instructions updated and sent: my chart

## 2023-10-06 NOTE — TELEPHONE ENCOUNTER
Called patient and left a message stating to call back to [phone] option 4 to update us with what surgeon said about proceeding with Colonoscopy.     Mira Greer RN

## 2023-12-22 NOTE — TELEPHONE ENCOUNTER
Rescheduled colonoscopy    Pre assessment completed for upcoming procedure.      Procedure details:    Patient scheduled for Colonoscopy  on 1.5.24.     Arrival time: 0715. Procedure time 0815    Pre op exam needed? N/A    Facility location: [facility]; [address], [address], [place] [address]    Sedation type: Conscious sedation     Indication for procedure: screening    COVID policy reviewed.    Designated  policy reviewed. Instructed to have someone stay 6 hours post procedure.       Chart review:     Electronic implanted devices? No    Recent diagnosis of diverticulitis within the last 6 weeks?  No    Diabetic? Yes. See medication holding recommendations     Diabetic medication HOLDING recommendations: (if applicable)  Oral diabetic medications: Yes:  Metformin (glucophage): HOLD day of procedure.  Diabetic injectables: Yes- Ozempic (Semaglutide). Weekly dosing of medication.  Hold 7 days before procedure.  Follow up with managing provider.   Insulin: No    Medication review:    Anticoagulants? No    NSAIDS? No    Other medication HOLDING recommendations:  N/A      Prep for procedure:     Bowel prep recommendation: Standard Golytely   Due to: diabetes.     Prep instructions sent via [employer] Bowel prep script sent to    [facility] #[id number] [place], [place] - [facility] [address] AT [facility]    Reviewed procedure prep instructions.     Patient verbalized understanding and had no questions or concerns at this time.        Venessa Courtney RN  Endoscopy Procedure Pre Assessment RN  [phone] option 4

## 2023-12-26 DIAGNOSIS — E11.9 TYPE 2 DIABETES, HBA1C GOAL < 8% (H): ICD-10-CM

## 2023-12-27 ENCOUNTER — MYC MEDICAL ADVICE (OUTPATIENT)
Dept: PEDIATRICS | Facility: CLINIC | Age: 48
End: 2023-12-27
Payer: COMMERCIAL

## 2023-12-27 DIAGNOSIS — E11.9 TYPE 2 DIABETES, HBA1C GOAL < 8% (H): ICD-10-CM

## 2023-12-27 RX ORDER — SEMAGLUTIDE 1.34 MG/ML
INJECTION, SOLUTION SUBCUTANEOUS
Qty: 3 ML | Refills: 3 | OUTPATIENT
Start: 2023-12-27

## 2024-01-04 RX ORDER — PROCHLORPERAZINE MALEATE 10 MG
10 TABLET ORAL EVERY 6 HOURS PRN
Status: CANCELLED | OUTPATIENT
Start: 2024-01-04

## 2024-01-04 RX ORDER — FLUMAZENIL 0.1 MG/ML
0.2 INJECTION, SOLUTION INTRAVENOUS
Status: CANCELLED | OUTPATIENT
Start: 2024-01-04 | End: 2024-01-05

## 2024-01-04 RX ORDER — ONDANSETRON 4 MG/1
4 TABLET, ORALLY DISINTEGRATING ORAL EVERY 6 HOURS PRN
Status: CANCELLED | OUTPATIENT
Start: 2024-01-04

## 2024-01-04 RX ORDER — NALOXONE HYDROCHLORIDE 0.4 MG/ML
0.4 INJECTION, SOLUTION INTRAMUSCULAR; INTRAVENOUS; SUBCUTANEOUS
Status: CANCELLED | OUTPATIENT
Start: 2024-01-04

## 2024-01-04 RX ORDER — ONDANSETRON 2 MG/ML
4 INJECTION INTRAMUSCULAR; INTRAVENOUS EVERY 6 HOURS PRN
Status: CANCELLED | OUTPATIENT
Start: 2024-01-04

## 2024-01-04 RX ORDER — NALOXONE HYDROCHLORIDE 0.4 MG/ML
0.2 INJECTION, SOLUTION INTRAMUSCULAR; INTRAVENOUS; SUBCUTANEOUS
Status: CANCELLED | OUTPATIENT
Start: 2024-01-04

## 2024-01-05 ENCOUNTER — HOSPITAL ENCOUNTER (OUTPATIENT)
Facility: CLINIC | Age: 49
Discharge: HOME | End: 2024-01-05
Attending: INTERNAL MEDICINE | Admitting: INTERNAL MEDICINE
Payer: COMMERCIAL

## 2024-01-05 VITALS
RESPIRATION RATE: 16 BRPM | DIASTOLIC BLOOD PRESSURE: 83 MMHG | SYSTOLIC BLOOD PRESSURE: 131 MMHG | OXYGEN SATURATION: 96 % | HEART RATE: 69 BPM

## 2024-01-05 LAB
COLONOSCOPY: NORMAL
GLUCOSE BLDC GLUCOMTR-MCNC: 198 MG/DL (ref 70–99)

## 2024-01-05 PROCEDURE — 88305 TISSUE EXAM BY PATHOLOGIST: CPT | Mod: TC | Performed by: INTERNAL MEDICINE

## 2024-01-05 PROCEDURE — 45380 COLONOSCOPY AND BIOPSY: CPT | Performed by: INTERNAL MEDICINE

## 2024-01-05 PROCEDURE — 99153 MOD SED SAME PHYS/QHP EA: CPT | Performed by: INTERNAL MEDICINE

## 2024-01-05 PROCEDURE — 88305 TISSUE EXAM BY PATHOLOGIST: CPT | Mod: 26 | Performed by: PATHOLOGY

## 2024-01-05 PROCEDURE — 82962 GLUCOSE BLOOD TEST: CPT

## 2024-01-05 PROCEDURE — 250N000011 HC RX IP 250 OP 636: Performed by: INTERNAL MEDICINE

## 2024-01-05 PROCEDURE — G0500 MOD SEDAT ENDO SERVICE >5YRS: HCPCS | Performed by: INTERNAL MEDICINE

## 2024-01-05 PROCEDURE — 45385 COLONOSCOPY W/LESION REMOVAL: CPT | Mod: PT | Performed by: INTERNAL MEDICINE

## 2024-01-05 RX ORDER — ONDANSETRON 2 MG/ML
4 INJECTION INTRAMUSCULAR; INTRAVENOUS
Status: DISCONTINUED | OUTPATIENT
Start: 2024-01-05 | End: 2024-01-05 | Stop reason: HOSPADM

## 2024-01-05 RX ORDER — LIDOCAINE 40 MG/G
CREAM TOPICAL
Status: DISCONTINUED | OUTPATIENT
Start: 2024-01-05 | End: 2024-01-05 | Stop reason: HOSPADM

## 2024-01-05 RX ORDER — FENTANYL CITRATE 50 UG/ML
INJECTION, SOLUTION INTRAMUSCULAR; INTRAVENOUS PRN
Status: DISCONTINUED | OUTPATIENT
Start: 2024-01-05 | End: 2024-01-05 | Stop reason: HOSPADM

## 2024-01-05 ASSESSMENT — ACTIVITIES OF DAILY LIVING (ADL): ADLS_ACUITY_SCORE: 35

## 2024-01-05 NOTE — H&P
ENDOSCOPY PRE-SEDATION H&P FOR OUTPATIENT PROCEDURES    Arturo Mei  [id number]  1975    Procedure: colonoscopy    Pre-procedure diagnosis: screening    Past medical history:   Past Medical History:   Diagnosis Date    Hepatic steatosis     Hyperlipidemia LDL goal <130     Obstructive sleep apnea     Type 2 diabetes, HbA1C goal < 8% (H)     Umbilical hernia with obstruction 07/20/2023    Vitamin D deficiency        Past surgical history:   Past Surgical History:   Procedure Laterality Date    ARTHROSCOPY KNEE      Description: Arthroscopy Knee;  Recorded: 05/29/2013;  Comments: medial meniscus    HERNIORRHAPHY UMBILICAL N/A 9/27/2023    Procedure: HERNIORRHAPHY, UMBILICAL, OPEN;  Surgeon: Ar Saxena MD;  Location: [facility]    SEPTOPLASTY  2007       No current facility-administered medications for this encounter.       No Known Allergies    History of Anesthesia/Sedation Problems: no    PHYSICAL EXAMINATION:  Constitutional: aaox3, cooperative, pleasant  Vitals reviewed: BP (!) 137/95   Pulse 72   Resp 14   SpO2 97%   Wt:   Wt Readings from Last 2 Encounters:   09/27/23 106.6 kg (235 lb)   08/18/23 109.2 kg (240 lb 12.8 oz)      Eyes: Sclera anicteric/injected  Ears/nose/mouth/throat: Normal oropharynx without ulcers or exudate, mucus membranes moist, hearing intact  Neck: supple, thyroid normal size  CV: No edema  Respiratory: Unlabored breathing  Lymph: No submandibular, supraclavicular or inguinal lymphadenopathy  Abd: Nondistended, no masses, nontender  Skin: warm, perfused, no jaundice  Psych: Normal affect  MSK: normal movement on limited exam.    ASA Score: See Provation note    Assessment/Plan:     The patient is an appropriate candidate to receive sedation.    Informed consent was discussed with the patient/family, including the risks, benefits, potential complications and any alternative options associated with sedation.    Patient assessment completed just prior to sedation  and while under constant observation by the provider. Condition determined to be adequate for proceeding with sedation.    The specific risks for the procedure were discussed with the patient at the time of informed consent and include but are not limited to perforation which could require surgery, missing significant neoplasm or lesion, hemorrhage and adverse sedative complication.      Bryan Harris MD

## 2024-01-05 NOTE — OR NURSING
Patient underwent colonoscopy with polypectomy x 5 + clip x 1 under conscious sedation. Tolerated procedure. Specimens sent to lab. Report given to endo recovery RN.

## 2024-01-08 LAB
PATH REPORT.COMMENTS IMP SPEC: NORMAL
PATH REPORT.COMMENTS IMP SPEC: NORMAL
PATH REPORT.FINAL DX SPEC: NORMAL
PATH REPORT.GROSS SPEC: NORMAL
PATH REPORT.MICROSCOPIC SPEC OTHER STN: NORMAL
PATH REPORT.RELEVANT HX SPEC: NORMAL
PHOTO IMAGE: NORMAL

## 2024-01-28 ENCOUNTER — HEALTH MAINTENANCE LETTER (OUTPATIENT)
Age: 49
End: 2024-01-28

## 2024-01-29 ENCOUNTER — LAB (OUTPATIENT)
Dept: LAB | Facility: CLINIC | Age: 49
End: 2024-01-29
Payer: COMMERCIAL

## 2024-01-29 DIAGNOSIS — E11.9 TYPE 2 DIABETES, HBA1C GOAL < 8% (H): ICD-10-CM

## 2024-01-29 LAB
HBA1C MFR BLD: 7.9 % (ref 0–5.6)
HOLD SPECIMEN: NORMAL

## 2024-01-29 PROCEDURE — 83036 HEMOGLOBIN GLYCOSYLATED A1C: CPT

## 2024-01-29 PROCEDURE — 36415 COLL VENOUS BLD VENIPUNCTURE: CPT

## 2024-02-01 ENCOUNTER — OFFICE VISIT (OUTPATIENT)
Dept: PEDIATRICS | Facility: CLINIC | Age: 49
End: 2024-02-01
Payer: COMMERCIAL

## 2024-02-01 VITALS
DIASTOLIC BLOOD PRESSURE: 80 MMHG | HEIGHT: 74 IN | RESPIRATION RATE: 16 BRPM | TEMPERATURE: 98.2 F | BODY MASS INDEX: 30.93 KG/M2 | SYSTOLIC BLOOD PRESSURE: 112 MMHG | HEART RATE: 71 BPM | OXYGEN SATURATION: 96 % | WEIGHT: 241 LBS

## 2024-02-01 DIAGNOSIS — E11.9 TYPE 2 DIABETES, HBA1C GOAL < 8% (H): ICD-10-CM

## 2024-02-01 DIAGNOSIS — Z11.4 SCREENING FOR HIV (HUMAN IMMUNODEFICIENCY VIRUS): ICD-10-CM

## 2024-02-01 DIAGNOSIS — Z00.00 ENCOUNTER FOR ROUTINE ADULT HEALTH EXAMINATION WITHOUT ABNORMAL FINDINGS: Primary | ICD-10-CM

## 2024-02-01 DIAGNOSIS — E11.69 TYPE 2 DIABETES MELLITUS WITH OTHER SPECIFIED COMPLICATION, WITHOUT LONG-TERM CURRENT USE OF INSULIN (H): ICD-10-CM

## 2024-02-01 PROCEDURE — 99396 PREV VISIT EST AGE 40-64: CPT | Performed by: INTERNAL MEDICINE

## 2024-02-01 PROCEDURE — 99214 OFFICE O/P EST MOD 30 MIN: CPT | Mod: 25 | Performed by: INTERNAL MEDICINE

## 2024-02-01 SDOH — HEALTH STABILITY: PHYSICAL HEALTH: ON AVERAGE, HOW MANY DAYS PER WEEK DO YOU ENGAGE IN MODERATE TO STRENUOUS EXERCISE (LIKE A BRISK WALK)?: 1 DAY

## 2024-02-01 ASSESSMENT — PAIN SCALES - GENERAL: PAINLEVEL: NO PAIN (0)

## 2024-02-01 ASSESSMENT — SOCIAL DETERMINANTS OF HEALTH (SDOH): HOW OFTEN DO YOU GET TOGETHER WITH FRIENDS OR RELATIVES?: ONCE A WEEK

## 2024-02-01 NOTE — PATIENT INSTRUCTIONS
"Try to limit complex carbs (rice, bread, pasta, potatoes, cereals) and simple carbs (pop, juice, alcohol, and even milk.)  Eating more lean proteins (chicken, fish, eggs, beans, nuts) and unlimited vegetables and fruits can definitely help as well.     In general, try to spread meals out during the day, eating smaller breakfasts, lunches and dinners--and having healthy snacks in between.  It's a good idea to limit your carbs at mealtimes to 60-75 grams of carbs, and to limit your carbs at snacktimes to 15-30 grams of carbs.  (Check the food labels to add up these carbs.)     Cardio exercise is probably the most helpful thing for your heart and future health.  Try to get about 30 minutes of sustained cardio exercise (biking, running, swimming, fast walking) every other day or even every day.  Keeping your heart rate at a \"target\" of (220 - your age) x 0.80 will be your goal.  For example, if you're 60 years old, this would be (220 - 60) x 0.80 = 128 beats per minute for those 30 minutes of exercise.     Goal weight:  225 at home, undressed.     Up to date on shots and cancer screenings.    Age 52: Shingrix shots.    Follow up in 6 months for diabetes mellitus follow up.   "

## 2024-02-01 NOTE — PROGRESS NOTES
Preventive Care Visit  [facility], Internal Medicine - Pediatrics  Feb 1, 2024      SUBJECTIVE:   Arturo is a 48 year old, presenting for the following:  Physical    Less active since his umbilical surgery.         2/1/2024     7:00 AM   Additional Questions   Roomed by Fani MCADAMS LPN     Healthy Habits:     Taking medications regularly:  0  History of Present Illness       Diabetes:   He presents for follow up of diabetes.  He is checking home blood glucose a few times a week.   He checks blood glucose before and after meals.  Blood glucose is never over 200 and never under 70. He is aware of hypoglycemia symptoms including none.    He has no concerns regarding his diabetes at this time.   He is not experiencing numbness or burning in feet, excessive thirst, blurry vision, weight changes or redness, sores or blisters on feet.           Hyperlipidemia:  He presents for follow up of hyperlipidemia.   He is taking medication to lower cholesterol. He is not having myalgia or other side effects to statin medications.    He eats 2-3 servings of fruits and vegetables daily.He consumes 0 sweetened beverage(s) daily.He exercises with enough effort to increase his heart rate 20 to 29 minutes per day.  He exercises with enough effort to increase his heart rate 3 or less days per week.   He is taking medications regularly.      Today's PHQ-2 Score:       2/1/2024     6:55 AM   PHQ-2 ( 1999 [employer])   Q1: Little interest or pleasure in doing things 0   Q2: Feeling down, depressed or hopeless 0   PHQ-2 Score 0   Q1: Little interest or pleasure in doing things Not at all   Q2: Feeling down, depressed or hopeless Not at all   PHQ-2 Score 0                   Have you ever done Advance Care Planning? (For example, a Health Directive, POLST, or a discussion with a medical provider or your loved ones about your wishes): No, advance care planning information given to patient to review.  Patient  "declined advance care planning discussion at this time.    Social History     Tobacco Use    Smoking status: Never    Smokeless tobacco: Never   Substance Use Topics    Alcohol use: Yes     Alcohol/week: 0.0 - 16.7 standard drinks of alcohol     Comment: Alcoholic Drinks/day: cut back 2017, occassional             2/1/2024     6:55 AM   Alcohol Use   Prescreen: >3 drinks/day or >7 drinks/week? Yes   AUDIT SCORE  7       Last PSA: No results found for: \"PSA\"    Reviewed orders with patient. Reviewed health maintenance and updated orders accordingly - Yes  Lab work is in process  Labs reviewed in EPIC    Reviewed and updated as needed this visit by clinical staff   Tobacco  Allergies  Meds              Reviewed and updated as needed this visit by Provider                  Past Medical History:   Diagnosis Date    Hepatic steatosis     Hyperlipidemia LDL goal <130     Obstructive sleep apnea     Type 2 diabetes, HbA1C goal < 8% (H)     Umbilical hernia with obstruction 07/20/2023    Vitamin D deficiency       Past Surgical History:   Procedure Laterality Date    ARTHROSCOPY KNEE      Description: Arthroscopy Knee;  Recorded: 05/29/2013;  Comments: medial meniscus    COLONOSCOPY N/A 1/5/2024    Procedure: COLONOSCOPY, WITH POLYPECTOMY;  Surgeon: Bryan Harris MD;  Location:  GI    HERNIORRHAPHY UMBILICAL N/A 9/27/2023    Procedure: HERNIORRHAPHY, UMBILICAL, OPEN;  Surgeon: Ar Saxena MD;  Location: [facility]    SEPTOPLASTY  2007     Review of Systems    Review of Systems  Constitutional, HEENT, cardiovascular, pulmonary, GI, , musculoskeletal, neuro, skin, endocrine and psych systems are negative, except as otherwise noted.    OBJECTIVE:   /80   Pulse 71   Temp 98.2  F (36.8  C) (Oral)   Resp 16   Ht 1.88 m (6' 2\")   Wt 109.3 kg (241 lb)   SpO2 96%   BMI 30.94 kg/m     Estimated body mass index is 30.94 kg/m  as calculated from the following:    Height as of this " "encounter: 1.88 m (6' 2\").    Weight as of this encounter: 109.3 kg (241 lb).  Physical Exam  GENERAL: alert and no distress  EYES: Eyes grossly normal to inspection, PERRL and conjunctivae and sclerae normal  HENT: ear canals and TM's normal, nose and mouth without ulcers or lesions  NECK: no adenopathy, no asymmetry, masses, or scars  RESP: lungs clear to auscultation - no rales, rhonchi or wheezes  CV: regular rate and rhythm, normal S1 S2, no S3 or S4, no murmur, click or rub, no peripheral edema  ABDOMEN: soft, nontender, no hepatosplenomegaly, no masses and bowel sounds normal  MS: no gross musculoskeletal defects noted, no edema  SKIN: no suspicious lesions or rashes  NEURO: Normal strength and tone, mentation intact and speech normal  PSYCH: mentation appears normal, affect normal/bright  LYMPH: no cervical, supraclavicular, axillary, or inguinal adenopathy    Diagnostic Test Results:  Labs reviewed in Epic    ASSESSMENT/PLAN:     Type 2 diabetes mellitus with other specified complication, without long-term current use of insulin (H)  Prefers NOT to increase ozempic dose.  Ongoing 1 mg.   - Lipid panel reflex to direct LDL Fasting; Future  - Hemoglobin A1c; Future  - Comprehensive metabolic panel (BMP + Alb, Alk Phos, ALT, AST, Total. Bili, TP); Future  - Albumin Random Urine Quantitative with Creat Ratio; Future  - OFFICE/OUTPT VISIT,EST,LEVL IV    Type 2 diabetes, HbA1C goal < 8% (H)  As above.  Other Parameters well controlled.  Continue secondary risk factor modification for BP, cholesterol, anticoagulation, and smoking cessation.  - Semaglutide, 1 MG/DOSE, (OZEMPIC) 4 MG/3ML pen; Inject 1 mg Subcutaneous every 7 days    Encounter for routine adult health examination without abnormal findings  Discussed diet, exercise, testicular self exam, blood pressure, cholesterol, and need for cancer surveillance at appropriate ages.     Patient has been advised of split billing requirements and indicates " understanding: Yes      Counseling  Reviewed preventive health counseling, as reflected in patient instructions       Regular exercise       Healthy diet/nutrition       Vision screening       Colorectal cancer screening       Prostate cancer screening        He reports that he has never smoked. He has never used smokeless tobacco.            Signed Electronically by: Bryn Redd MD

## 2024-03-13 ENCOUNTER — MYC MEDICAL ADVICE (OUTPATIENT)
Dept: PEDIATRICS | Facility: CLINIC | Age: 49
End: 2024-03-13
Payer: COMMERCIAL

## 2024-04-11 ENCOUNTER — OFFICE VISIT (OUTPATIENT)
Dept: PEDIATRICS | Facility: CLINIC | Age: 49
End: 2024-04-11
Payer: COMMERCIAL

## 2024-04-11 VITALS
TEMPERATURE: 98.1 F | RESPIRATION RATE: 14 BRPM | HEART RATE: 75 BPM | BODY MASS INDEX: 30.81 KG/M2 | DIASTOLIC BLOOD PRESSURE: 72 MMHG | HEIGHT: 74 IN | SYSTOLIC BLOOD PRESSURE: 124 MMHG | WEIGHT: 240.1 LBS | OXYGEN SATURATION: 97 %

## 2024-04-11 DIAGNOSIS — Z11.4 SCREENING FOR HIV (HUMAN IMMUNODEFICIENCY VIRUS): ICD-10-CM

## 2024-04-11 DIAGNOSIS — K43.9 VENTRAL HERNIA WITHOUT OBSTRUCTION OR GANGRENE: ICD-10-CM

## 2024-04-11 DIAGNOSIS — M75.102 ROTATOR CUFF SYNDROME, LEFT: Primary | ICD-10-CM

## 2024-04-11 PROCEDURE — 99214 OFFICE O/P EST MOD 30 MIN: CPT | Performed by: INTERNAL MEDICINE

## 2024-04-11 NOTE — PATIENT INSTRUCTIONS
Let's begin physical therapy for your left shoulder.      They can also give advice on abdominal wall exercises; standard ones should not make this worse.  Weight loss will help decrease intraabdominal fat.    Try to limit complex carbs (rice, bread, pasta, potatoes, cereals) and simple carbs (pop, juice, alcohol, and even milk.)  Eating more lean proteins (chicken, fish, eggs, beans, nuts) and unlimited vegetables and fruits can definitely help as well.     In general, try to spread meals out during the day, eating smaller breakfasts, lunches and dinners--and having healthy snacks in between.  It's a good idea to limit your carbs at mealtimes to 60-75 grams of carbs, and to limit your carbs at snacktimes to 15-30 grams of carbs.  (Check the food labels to add up these carbs.)

## 2024-04-11 NOTE — PROGRESS NOTES
"  Assessment & Plan       Ventral hernia without obstruction or gangrene  Weight loss discussed.  Ongoing weight loss.  Discussed possibly limiting heavy lifting.   - Physical Therapy  Referral; Future    Rotator cuff syndrome, left  Discussed physical therapy and stretching exercises.   Discussed ibuprofen and stretches.  - Physical Therapy  Referral; Future            BMI  Estimated body mass index is 30.83 kg/m  as calculated from the following:    Height as of this encounter: 1.88 m (6' 2\").    Weight as of this encounter: 108.9 kg (240 lb 1.6 oz).   Weight management plan: Discussed healthy diet and exercise guidelines          Chrissy Morris is a 48 year old, presenting for the following health issues:  Consult (Possible PT referrals)      4/11/2024     8:08 AM   Additional Questions   Roomed by Carissa Vargas CMA     History of Present Illness       Reason for visit:  Rotator cuff pain (left shoulder) persisting for a couple of years  Symptom onset:  More than a month  Symptoms include:  Muscle soreness, loss of range of motion im leaft shoulder and neck  Symptom intensity:  Moderate  Symptom progression:  Staying the same  Had these symptoms before:  Yes  Has tried/received treatment for these symptoms:  No  What makes it worse:  Lifting weights tends to increase soreness.  What makes it better:  Strecthes, temporarily.    He eats 2-3 servings of fruits and vegetables daily.He consumes 0 sweetened beverage(s) daily.He exercises with enough effort to increase his heart rate 30 to 60 minutes per day.  He exercises with enough effort to increase his heart rate 5 days per week.   He is taking medications regularly.       Left shoulder rotator cuff; bothers when does weight lifting, but fine afterward.  Some decreased range of motion of neck.   Has gone on for several years.  Not so painful and debilitating.  But cannot do work overhead.  Has tried some weight exercises from time to time.  " "    No weakness or numbness.  No history of trauma.    Had umbilical hernia repaired.  Has some ongoing diastasis recti. The diastasis has not worsened after his surgery.               Review of Systems  Constitutional, HEENT, cardiovascular, pulmonary, GI, , musculoskeletal, neuro, skin, endocrine and psych systems are negative, except as otherwise noted.      Objective    /72 (BP Location: Right arm, Patient Position: Sitting, Cuff Size: Adult Large)   Pulse 75   Temp 98.1  F (36.7  C) (Temporal)   Resp 14   Ht 1.88 m (6' 2\")   Wt 108.9 kg (240 lb 1.6 oz)   SpO2 97%   BMI 30.83 kg/m    Body mass index is 30.83 kg/m .  Physical Exam   GENERAL: alert and no distress  EYES: Eyes grossly normal to inspection, PERRL and conjunctivae and sclerae normal  HENT: ear canals and TM's normal, nose and mouth without ulcers or lesions  NECK: no adenopathy, no asymmetry, masses, or scars  RESP: lungs clear to auscultation - no rales, rhonchi or wheezes  CV: regular rate and rhythm, normal S1 S2, no S3 or S4, no murmur, click or rub, no peripheral edema  ABDOMEN: soft, nontender, no hepatosplenomegaly, no masses and bowel sounds normal  MS: no gross musculoskeletal defects noted, no edema  SKIN: no suspicious lesions or rashes  NEURO: Normal strength and tone, mentation intact and speech normal  PSYCH: mentation appears normal, affect normal/bright            Signed Electronically by: Bryn Redd MD    "

## 2024-04-18 ASSESSMENT — ACTIVITIES OF DAILY LIVING (ADL)
TOUCHING_THE_BACK_OF_YOUR_NECK: 2
PLACING_AN_OBJECT_ON_A_HIGH_SHELF: 1
PUSHING_WITH_THE_INVOLVED_ARM: 3
WASHING_YOUR_HAIR?: 0
PUTTING_ON_A_SHIRT_THAT_BUTTONS_DOWN_THE_FRONT: 0
WHEN_LYING_ON_THE_INVOLVED_SIDE: 5
REACHING_FOR_SOMETHING_ON_A_HIGH_SHELF: 5
REMOVING_SOMETHING_FROM_YOUR_BACK_POCKET: 2
PUTTING_ON_YOUR_PANTS: 0
CARRYING_A_HEAVY_OBJECT_OF_10_POUNDS: 0
PLEASE_INDICATE_YOR_PRIMARY_REASON_FOR_REFERRAL_TO_THERAPY:: SHOULDER
AT_ITS_WORST?: 5
WASHING_YOUR_BACK: 3
PUTTING_ON_AN_UNDERSHIRT_OR_A_PULLOVER_SWEATER: 0

## 2024-04-25 ENCOUNTER — THERAPY VISIT (OUTPATIENT)
Dept: PHYSICAL THERAPY | Facility: CLINIC | Age: 49
End: 2024-04-25
Attending: INTERNAL MEDICINE
Payer: COMMERCIAL

## 2024-04-25 DIAGNOSIS — M75.102 ROTATOR CUFF SYNDROME, LEFT: ICD-10-CM

## 2024-04-25 DIAGNOSIS — K43.9 VENTRAL HERNIA WITHOUT OBSTRUCTION OR GANGRENE: ICD-10-CM

## 2024-04-25 DIAGNOSIS — M54.2 CERVICAL SPINE PAIN: Primary | ICD-10-CM

## 2024-04-25 DIAGNOSIS — M54.6 PAIN IN THORACIC SPINE: ICD-10-CM

## 2024-04-25 PROCEDURE — 97112 NEUROMUSCULAR REEDUCATION: CPT | Mod: GP | Performed by: PHYSICAL THERAPIST

## 2024-04-25 PROCEDURE — 97161 PT EVAL LOW COMPLEX 20 MIN: CPT | Mod: GP | Performed by: PHYSICAL THERAPIST

## 2024-04-25 PROCEDURE — 97110 THERAPEUTIC EXERCISES: CPT | Mod: GP | Performed by: PHYSICAL THERAPIST

## 2024-04-25 NOTE — PROGRESS NOTES
PHYSICAL THERAPY EVALUATION  Type of Visit: Evaluation    See electronic medical record for Abuse and Falls Screening details.    Subjective       Presenting condition or subjective complaint: I have regular left shoulder and neck/back pain around the left shoulder.  Date of onset: 01/25/24    Relevant medical history: Diabetes; Overweight; Sleep disorder like apnea   Dates & types of surgery: Repair umbillical hernia, 2023. Repair partially torn meniscus in right knee approx. 2010. Repair deviated septum, approximately 2005.    Prior diagnostic imaging/testing results:       Prior therapy history for the same diagnosis, illness or injury: No      Prior Level of Function  Transfers:   Ambulation:   ADL:   IADL:     Living Environment  Social support: With a significant other or spouse   Type of home: House   Stairs to enter the home: Yes 2 Is there a railing: No   Ramp: No   Stairs inside the home: Yes 14 Is there a railing: Yes   Help at home: None  Equipment owned:       Employment: Yes Director of [employer]  Hobbies/Interests: Golf, biking, travelling.    Patient goals for therapy: Swing a golf club.    Pain assessment: See objective evaluation for additional pain details    During session pt also reports recent onset of R thoracic spine pain that started one week ago.     Objective   SHOULDER EVALUATION  PAIN: Pain Level at Rest: 1/10  Pain Level with Use: 6/10  Pain Location: L cervical spine to L anterior shoulder  Pain Quality: Aching, Dull, and intense  Pain Frequency: intermittent  Pain is Exacerbated By: lifting, UE use, golf, tennis, sleep  Pain is Relieved By: heat and stretch  Pain Progression: Worsened  INTEGUMENTARY (edema, incisions):   POSTURE: Sitting Posture: Rounded shoulders, Forward head, Thoracic kyphosis increased  GAIT:   Weightbearing Status: WBAT  Assistive Device(s): None  Gait Deviations: WNL  BALANCE/PROPRIOCEPTION:   WEIGHTBEARING ALIGNMENT:   ROM:   (Degrees) Left AROM  Left PROM Right AROM  Right PROM   Shoulder Flexion WNL  WNL    Shoulder Extension       Shoulder Abduction WNL with stiffness at top  WNL    Shoulder Adduction       Shoulder Internal Rotation To T10  To T12    Shoulder External Rotation To T2  To T2    Shoulder Horizontal Abduction       Shoulder Horizontal Adduction       Shoulder Flexion ER       Shoulder Flexion IR       Elbow Extension       Elbow Flexion       Pain:   End feel:     STRENGTH:   Pain: - none + mild ++ moderate +++ severe  Strength Scale: 0-5/5 Left Right   Shoulder Flexion 5 5   Shoulder Extension     Shoulder Abduction 5 5   Shoulder Adduction     Shoulder Internal Rotation 5 5   Shoulder External Rotation 4+ 5-   Shoulder Horizontal Abduction     Shoulder Horizontal Adduction     Elbow Flexion 5 5   Elbow Extension 5 5   Mid Trap     Lower Trap     Rhomboid     Serratus Anterior       FLEXIBILITY:   SPECIAL TESTS:    Left Right   Impingement     Neer's Positive Negative    Hawkin's-Bennett Positive Negative    Coracoid Impingement     Internal impingement     Labral     Anterior Slide     [place]'s     Crank     Instability     Apprehension (anterior)     Relocation (anterior)     Anterior Load & Shift     Posterior Load & Shift     Posterior instability (with 90 degrees flex)     Multi-Directional Instability      Sulcus     Biceps      Speed's Negative  Negative    Rotator Cuff Tear     Drop Arm Negative  Negative    Belly Press Negative  Negative    Lift off        PALPATION:   + Tenderness At Location Left Right   Clavicle     Sternoclavicular     Acromioclavicular -    Biceps +    Triceps     Supraspinatus -    Infraspinatus -    Teres minor -    Subscapularis -    Deltoid     Levator +    Rhomboids +    Upper trap +    Incisional     Bicipital groove       JOINT MOBILITY:   CERVICAL SCREEN:   (Degrees) Left AROM Right AROM   Cervical Flexion WNL   Cervical Extension Mild with pain   Side bend Mild with pain Moderate with pain   Rotation  Mild with pain Moderate with pain on R   Thoracic Flexion    Thoracic Extension severe   Thoracic Rotation     Thoracic Outlet Screen (Leila, Adson)        Left Right   Alar Ligament    Cervical Flexion-Rotation     Cervical Rot/Lateral Flex     Compression Negative  Negative    Distraction     Spurling s Negative  Negative    Thoracic Outlet Screen (Leila, Adson)     Transverse Ligament     Vertebral Artery         Assessment & Plan   CLINICAL IMPRESSIONS  Medical Diagnosis: Ventral hernia without obstruction or gangrene  Rotator cuff syndrome, left    Treatment Diagnosis: Ventral hernia without obstruction or gangrene  Rotator cuff syndrome, left ; thoracic spine pain, left cervical spine pain   Impression/Assessment: Patient is a 48 year old male with left cervical spine, L shoulder and R thoracic spine complaints.  The following significant findings have been identified: Pain, Decreased ROM/flexibility, Decreased joint mobility, Decreased strength, and Impaired posture. These impairments interfere with their ability to perform recreational activities, household chores, and driving  as compared to previous level of function.     Clinical Decision Making (Complexity):  Clinical Presentation: Stable/Uncomplicated  Clinical Presentation Rationale: based on medical and personal factors listed in PT evaluation  Clinical Decision Making (Complexity): Low complexity    PLAN OF CARE  Treatment Interventions:  Modalities: Cryotherapy, Dry Needling, E-stim, Hot Pack  Interventions: Gait Training, Manual Therapy, Neuromuscular Re-education, Therapeutic Activity, Therapeutic Exercise, Self-Care/Home Management    Long Term Goals     PT Goal 1  Goal Identifier: Golf  Goal Description: Pt will be able to play a round of golf with <3/10 pain  Target Date: 07/18/24  PT Goal 2  Goal Identifier: Lifting  Goal Description: Pt will be able to complete lifting exercises 3-4 days per week with <3/10 pain to return to prior recreational  activities  Target Date: 07/18/24      Frequency of Treatment: 1x/week for 8 weeks, then every other week  Duration of Treatment: 12 weeks    Recommended Referrals to Other Professionals:   Education Assessment:   Learner/Method: Patient;Pictures/Video    Risks and benefits of evaluation/treatment have been explained.   Patient/Family/caregiver agrees with Plan of Care.     Evaluation Time:             Signing Clinician: Daniella Kim, PT

## 2024-05-09 ENCOUNTER — THERAPY VISIT (OUTPATIENT)
Dept: PHYSICAL THERAPY | Facility: CLINIC | Age: 49
End: 2024-05-09
Attending: INTERNAL MEDICINE
Payer: COMMERCIAL

## 2024-05-09 DIAGNOSIS — M54.2 CERVICAL SPINE PAIN: ICD-10-CM

## 2024-05-09 DIAGNOSIS — M54.6 PAIN IN THORACIC SPINE: ICD-10-CM

## 2024-05-09 DIAGNOSIS — M75.102 ROTATOR CUFF SYNDROME, LEFT: ICD-10-CM

## 2024-05-09 DIAGNOSIS — K43.9 VENTRAL HERNIA WITHOUT OBSTRUCTION OR GANGRENE: Primary | ICD-10-CM

## 2024-05-09 PROCEDURE — 97112 NEUROMUSCULAR REEDUCATION: CPT | Mod: GP | Performed by: PHYSICAL THERAPIST

## 2024-05-09 PROCEDURE — 97140 MANUAL THERAPY 1/> REGIONS: CPT | Mod: GP | Performed by: PHYSICAL THERAPIST

## 2024-05-09 PROCEDURE — 97110 THERAPEUTIC EXERCISES: CPT | Mod: GP | Performed by: PHYSICAL THERAPIST

## 2024-05-16 ENCOUNTER — THERAPY VISIT (OUTPATIENT)
Dept: PHYSICAL THERAPY | Facility: CLINIC | Age: 49
End: 2024-05-16
Payer: COMMERCIAL

## 2024-05-16 DIAGNOSIS — M54.6 PAIN IN THORACIC SPINE: ICD-10-CM

## 2024-05-16 DIAGNOSIS — M75.102 ROTATOR CUFF SYNDROME, LEFT: ICD-10-CM

## 2024-05-16 DIAGNOSIS — K43.9 VENTRAL HERNIA WITHOUT OBSTRUCTION OR GANGRENE: Primary | ICD-10-CM

## 2024-05-16 DIAGNOSIS — M54.2 CERVICAL SPINE PAIN: ICD-10-CM

## 2024-05-16 PROCEDURE — 97140 MANUAL THERAPY 1/> REGIONS: CPT | Mod: GP | Performed by: PHYSICAL THERAPIST

## 2024-05-16 PROCEDURE — 97110 THERAPEUTIC EXERCISES: CPT | Mod: GP | Performed by: PHYSICAL THERAPIST

## 2024-05-30 ENCOUNTER — THERAPY VISIT (OUTPATIENT)
Dept: PHYSICAL THERAPY | Facility: CLINIC | Age: 49
End: 2024-05-30
Payer: COMMERCIAL

## 2024-05-30 DIAGNOSIS — M54.6 PAIN IN THORACIC SPINE: ICD-10-CM

## 2024-05-30 DIAGNOSIS — M54.2 CERVICAL SPINE PAIN: ICD-10-CM

## 2024-05-30 DIAGNOSIS — M75.102 ROTATOR CUFF SYNDROME, LEFT: ICD-10-CM

## 2024-05-30 DIAGNOSIS — K43.9 VENTRAL HERNIA WITHOUT OBSTRUCTION OR GANGRENE: Primary | ICD-10-CM

## 2024-05-30 PROCEDURE — 97140 MANUAL THERAPY 1/> REGIONS: CPT | Mod: GP | Performed by: PHYSICAL THERAPIST

## 2024-05-30 PROCEDURE — 97110 THERAPEUTIC EXERCISES: CPT | Mod: GP | Performed by: PHYSICAL THERAPIST

## 2024-06-06 ENCOUNTER — THERAPY VISIT (OUTPATIENT)
Dept: PHYSICAL THERAPY | Facility: CLINIC | Age: 49
End: 2024-06-06
Payer: COMMERCIAL

## 2024-06-06 DIAGNOSIS — M54.6 PAIN IN THORACIC SPINE: ICD-10-CM

## 2024-06-06 DIAGNOSIS — M54.2 CERVICAL SPINE PAIN: ICD-10-CM

## 2024-06-06 DIAGNOSIS — M75.102 ROTATOR CUFF SYNDROME, LEFT: ICD-10-CM

## 2024-06-06 DIAGNOSIS — K43.9 VENTRAL HERNIA WITHOUT OBSTRUCTION OR GANGRENE: Primary | ICD-10-CM

## 2024-06-06 PROCEDURE — 97110 THERAPEUTIC EXERCISES: CPT | Mod: GP | Performed by: PHYSICAL THERAPIST

## 2024-06-06 PROCEDURE — 97140 MANUAL THERAPY 1/> REGIONS: CPT | Mod: GP | Performed by: PHYSICAL THERAPIST

## 2024-06-13 ENCOUNTER — THERAPY VISIT (OUTPATIENT)
Dept: PHYSICAL THERAPY | Facility: CLINIC | Age: 49
End: 2024-06-13
Payer: COMMERCIAL

## 2024-06-13 DIAGNOSIS — K43.9 VENTRAL HERNIA WITHOUT OBSTRUCTION OR GANGRENE: Primary | ICD-10-CM

## 2024-06-13 DIAGNOSIS — M54.2 CERVICAL SPINE PAIN: ICD-10-CM

## 2024-06-13 DIAGNOSIS — M75.102 ROTATOR CUFF SYNDROME, LEFT: ICD-10-CM

## 2024-06-13 DIAGNOSIS — M54.6 PAIN IN THORACIC SPINE: ICD-10-CM

## 2024-06-13 PROCEDURE — 97140 MANUAL THERAPY 1/> REGIONS: CPT | Mod: GP | Performed by: PHYSICAL THERAPIST

## 2024-06-13 PROCEDURE — 97110 THERAPEUTIC EXERCISES: CPT | Mod: GP | Performed by: PHYSICAL THERAPIST

## 2024-06-13 NOTE — PROGRESS NOTES
PLAN  Continue therapy per current plan of care.    Beginning/End Dates of Progress Note Reporting Period:  04/25/24 to 06/13/2024    Referring Provider:  Bryn Redd     06/13/24 0500   Appointment Info   Signing clinician's name / credentials Daniella Kim, PT, DPT, CLT-JIGNESH   Visits Used 6   Medical Diagnosis Ventral hernia without obstruction or gangrene  Rotator cuff syndrome, left   PT Tx Diagnosis Ventral hernia without obstruction or gangrene  Rotator cuff syndrome, left ; thoracic spine pain, left cervical spine pain   Progress Note/Certification   Onset of illness/injury or Date of Surgery 01/25/24   Therapy Frequency 1x/week for 8 weeks, then every other week   Predicted Duration 12 weeks   Progress Note Due Date 07/18/24   Progress Note Completed Date 04/25/24   GOALS   PT Goals 2;3;4   PT Goal 1   Goal Identifier Golf   Goal Description Pt will be able to play a round of golf with <3/10 pain   Goal Progress has not tried due to time   Target Date 07/18/24   PT Goal 2   Goal Identifier Lifting   Goal Description Pt will be able to complete lifting exercises 3-4 days per week with <3/10 pain to return to prior recreational activities   Goal Progress PT exercises doing 2-3 times per week, stretching daily   Target Date 07/18/24   Subjective Report   Subjective Report Pt reports increased L shoulder pain after picking up his son. Pain is getting better but has worsened over the past few days. Pain: 3/10 L shoulder and cervical spine   Objective Measures   Objective Measures Objective Measure 1;Objective Measure 2   Objective Measure 1   Objective Measure Cervical AROM   Details Rotation: R 58 deg (60 deg at end), L 48 deg with ERP (53 deg at end)   Objective Measure 2   Objective Measure Palpation   Details tender along L scalenes   Treatment Interventions (PT)   Interventions Therapeutic Procedure/Exercise;Neuromuscular Re-education;Manual Therapy;Therapeutic Activity   Therapeutic Procedure/Exercise    Therapeutic Procedures: strength, endurance, ROM, flexibility minutes (88136) 15   Therapeutic Procedures Ther Proc 2;Ther Proc 3   Ther Proc 1 UBE   Ther Proc 1 - Details x 4 min, WL3 forwards/backwards for UE strengthening and mobility   Ther Proc 2 HEP   Ther Proc 3 S/L and prone cervical /back strengthening   Ther Proc 3 - Details focus on these exercises at least 3-4x/week   PTRx Ther Proc 1 Shoulder Horizontal Abduction/Diagonals With Theraband   PTRx Ther Proc 1 - Details x 15 reps with L2 band    PTRx Ther Proc 2 Shoulder Scaption Full Can   PTRx Ther Proc 2 - Details 2 x 10 reps B with 2#    PTRx Ther Proc 3 Scalene Stretch   PTRx Ther Proc 3 - Details 2 x 30 sec hold B- cues to help improve stretch   PTRx Ther Proc 4 Upper Trapezius Stretch   PTRx Ther Proc 4 - Details 2 x 30 sec hold B   PTRx Ther Proc 5 Self Cervical Snag with Towel   PTRx Ther Proc 5 - Details HEP   PTRx Ther Proc 6 Levator Scapulae Stretch   PTRx Ther Proc 6 - Details x 30 sec hold B   PTRx Ther Proc 7 Bilateral Rotation With Theraband   PTRx Ther Proc 7 - Details Able to use L3 band   PTRx Ther Proc 8 Healthy Computer Use   PTRx Ther Proc 8 - Details No Notes   PTRx Ther Proc 9 Norris and Arrow Stretch   PTRx Ther Proc 9 - Details No Notes   Skilled Intervention Progression of HEP for scapular and cervical strengthening to reduce pain, improve function and return to PLOF   Patient Response/Progress good demo of HEP   PTRx Ther Proc 10 All 4s Cat Cow   PTRx Ther Proc 10 - Details HEP   PTRx Ther Proc 11 All 4s Stretch   PTRx Ther Proc 11 - Details HEP   Therapeutic Activity   Therapeutic Activities Ther Act 2   Manual Therapy   Manual Therapy: Mobilization, MFR, MLD, friction massage minutes (40069) 24   Manual Therapy Manual Therapy 2;Manual Therapy 3   Manual Therapy 1 STM   Manual Therapy 1 - Details prone and supine STM to L cervical paraspinals, scalenes and L levator scapulea, supine SOR with gentle distraction 3 x 30 sec hold B    Manual Therapy 2 Joint mobilization   Manual Therapy 2 - Details prone central and unilateral PAs to C1-6, grade III-IV, 8 x 10-20 sec oscillations   Skilled Intervention joint mobilization and STM to improve joint and tissue  mobility to reduce pain and increase ROM   Patient Response/Progress continued improvement in cervical AROM but still reduced compared to best measurements in previous sessions   Manual Therapy 3 Theracane   Manual Therapy 3 - Details education, demo and practice with theracane for L upper quadrant with instructions on how to purchase OTC   Education   Learner/Method Patient;Pictures/Video   Plan   Home program PTRx   Plan for next session pt would like to follow up in 1 month- work on HEP consistently and then check back in   Comments   Comments Overall, pt does report improvement in his pain but he continues to have flare ups over these past few weeks.   Total Session Time   Timed Code Treatment Minutes 39   Total Treatment Time (sum of timed and untimed services) 39

## 2024-07-08 ENCOUNTER — LAB (OUTPATIENT)
Dept: LAB | Facility: CLINIC | Age: 49
End: 2024-07-08
Payer: COMMERCIAL

## 2024-07-08 DIAGNOSIS — E11.69 TYPE 2 DIABETES MELLITUS WITH OTHER SPECIFIED COMPLICATION, WITHOUT LONG-TERM CURRENT USE OF INSULIN (H): ICD-10-CM

## 2024-07-08 LAB
ALBUMIN SERPL BCG-MCNC: 4.7 G/DL (ref 3.5–5.2)
ALP SERPL-CCNC: 89 U/L (ref 40–150)
ALT SERPL W P-5'-P-CCNC: 76 U/L (ref 0–70)
ANION GAP SERPL CALCULATED.3IONS-SCNC: 10 MMOL/L (ref 7–15)
AST SERPL W P-5'-P-CCNC: 43 U/L (ref 0–45)
BILIRUB SERPL-MCNC: 0.4 MG/DL
BUN SERPL-MCNC: 14 MG/DL (ref 6–20)
CALCIUM SERPL-MCNC: 9.2 MG/DL (ref 8.6–10)
CHLORIDE SERPL-SCNC: 100 MMOL/L (ref 98–107)
CHOLEST SERPL-MCNC: 140 MG/DL
CREAT SERPL-MCNC: 0.99 MG/DL (ref 0.67–1.17)
CREAT UR-MCNC: 147 MG/DL
DEPRECATED HCO3 PLAS-SCNC: 26 MMOL/L (ref 22–29)
EGFRCR SERPLBLD CKD-EPI 2021: >90 ML/MIN/1.73M2
FASTING STATUS PATIENT QL REPORTED: YES
FASTING STATUS PATIENT QL REPORTED: YES
GLUCOSE SERPL-MCNC: 178 MG/DL (ref 70–99)
HBA1C MFR BLD: 7.5 % (ref 0–5.6)
HDLC SERPL-MCNC: 43 MG/DL
LDLC SERPL CALC-MCNC: 46 MG/DL
MICROALBUMIN UR-MCNC: <12 MG/L
MICROALBUMIN/CREAT UR: NORMAL MG/G{CREAT}
NONHDLC SERPL-MCNC: 97 MG/DL
POTASSIUM SERPL-SCNC: 5.1 MMOL/L (ref 3.4–5.3)
PROT SERPL-MCNC: 7.3 G/DL (ref 6.4–8.3)
SODIUM SERPL-SCNC: 136 MMOL/L (ref 135–145)
TRIGL SERPL-MCNC: 255 MG/DL

## 2024-07-08 PROCEDURE — 82570 ASSAY OF URINE CREATININE: CPT

## 2024-07-08 PROCEDURE — 82043 UR ALBUMIN QUANTITATIVE: CPT

## 2024-07-08 PROCEDURE — 80061 LIPID PANEL: CPT

## 2024-07-08 PROCEDURE — 80053 COMPREHEN METABOLIC PANEL: CPT

## 2024-07-08 PROCEDURE — 36415 COLL VENOUS BLD VENIPUNCTURE: CPT

## 2024-07-08 PROCEDURE — 83036 HEMOGLOBIN GLYCOSYLATED A1C: CPT

## 2024-07-18 ENCOUNTER — TRANSFERRED RECORDS (OUTPATIENT)
Dept: HEALTH INFORMATION MANAGEMENT | Facility: CLINIC | Age: 49
End: 2024-07-18

## 2024-08-06 ENCOUNTER — OFFICE VISIT (OUTPATIENT)
Dept: PEDIATRICS | Facility: CLINIC | Age: 49
End: 2024-08-06
Attending: INTERNAL MEDICINE
Payer: COMMERCIAL

## 2024-08-06 VITALS
OXYGEN SATURATION: 97 % | RESPIRATION RATE: 16 BRPM | BODY MASS INDEX: 31.41 KG/M2 | TEMPERATURE: 97.8 F | SYSTOLIC BLOOD PRESSURE: 138 MMHG | HEIGHT: 73 IN | WEIGHT: 237 LBS | HEART RATE: 69 BPM | DIASTOLIC BLOOD PRESSURE: 80 MMHG

## 2024-08-06 DIAGNOSIS — Z11.4 SCREENING FOR HIV (HUMAN IMMUNODEFICIENCY VIRUS): ICD-10-CM

## 2024-08-06 DIAGNOSIS — E78.5 HYPERLIPIDEMIA LDL GOAL <100: ICD-10-CM

## 2024-08-06 DIAGNOSIS — E11.9 TYPE 2 DIABETES, HBA1C GOAL < 8% (H): Primary | ICD-10-CM

## 2024-08-06 PROCEDURE — 99214 OFFICE O/P EST MOD 30 MIN: CPT | Performed by: INTERNAL MEDICINE

## 2024-08-06 PROCEDURE — 99207 PR FOOT EXAM NO CHARGE: CPT | Performed by: INTERNAL MEDICINE

## 2024-08-06 PROCEDURE — G2211 COMPLEX E/M VISIT ADD ON: HCPCS | Performed by: INTERNAL MEDICINE

## 2024-08-06 RX ORDER — ATORVASTATIN CALCIUM 20 MG/1
20 TABLET, FILM COATED ORAL DAILY
Qty: 90 TABLET | Refills: 3 | Status: SHIPPED | OUTPATIENT
Start: 2024-08-06

## 2024-08-06 ASSESSMENT — PAIN SCALES - GENERAL: PAINLEVEL: MILD PAIN (2)

## 2024-08-06 NOTE — PROGRESS NOTES
Assessment & Plan     Screening for HIV (human immunodeficiency virus)      Hyperlipidemia LDL goal <100  At LDL cholesterol goal.   - atorvastatin (LIPITOR) 20 MG tablet; Take 1 tablet (20 mg) by mouth daily    Type 2 diabetes, HbA1C goal < 8% (H)  Parameters well controlled.  Continue secondary risk factor modification for BP, cholesterol, anticoagulation, and smoking cessation..  Still has extra weight he could lose; increase ozempic to 2 mg.  Patient Instructions   Flu and COVID boosters this fall.    Let's increase your ozempic to 2 mg.      No changes in other meds.    Skin lesions look benign.    Bryn Redd MD  Internal Medicine and Pediatrics      - Semaglutide, 2 MG/DOSE, (OZEMPIC) 8 MG/3ML pen; Inject 2 mg subcutaneously every 7 days  - FOOT EXAM  - Hemoglobin A1c; Future  - Comprehensive metabolic panel (BMP + Alb, Alk Phos, ALT, AST, Total. Bili, TP); Future  - Lipid panel reflex to direct LDL Fasting; Future  - TSH with free T4 reflex; Future  - Albumin Random Urine Quantitative with Creat Ratio; Future                Subjective   Arturo is a 49 year old, presenting for the following health issues:  Diabetes        8/6/2024     7:54 AM   Additional Questions   Roomed by KELLY Javier   Accompanied by elissa         8/6/2024     7:54 AM   Patient Reported Additional Medications   Patient reports taking the following new medications no     History of Present Illness       Diabetes:   He presents for follow up of diabetes.  He is checking home blood glucose two times daily.   He checks blood glucose before and after meals.  Blood glucose is never over 200 and never under 70.  When his blood glucose is low, the patient is asymptomatic for confusion, blurred vision, lethargy and reports not feeling dizzy, shaky, or weak.   He has no concerns regarding his diabetes at this time.   He is not experiencing numbness or burning in feet, excessive thirst, blurry vision, weight changes or redness, sores or  "blisters on feet.             Some skin lesions; left neck, left leg and scrotum.  No concerning characteristics.     Left lower abdomen: pain on and off over the last 1 year; dull, comes and goes. More muscular. Occurs most weeks, 1-2 times.  Feels like \"a strain.\"    Weight down 3 pounds.  On ozempic 1 mg.  Some diarrhea.  (Thinks from atorvastatin).     AM sugars 140-170, and 2 hours post meal about the same.               Review of Systems  Constitutional, HEENT, cardiovascular, pulmonary, GI, , musculoskeletal, neuro, skin, endocrine and psych systems are negative, except as otherwise noted.      Objective    BP (!) 152/83 (BP Location: Right arm, Patient Position: Sitting, Cuff Size: Adult Large)   Pulse 69   Temp 97.8  F (36.6  C) (Oral)   Resp 16   Ht 1.854 m (6' 1\")   Wt 107.5 kg (237 lb)   SpO2 97%   BMI 31.27 kg/m    Body mass index is 31.27 kg/m .  Physical Exam   GENERAL: alert and no distress  EYES: Eyes grossly normal to inspection, PERRL and conjunctivae and sclerae normal  HENT: ear canals and TM's normal, nose and mouth without ulcers or lesions  NECK: no adenopathy, no asymmetry, masses, or scars  RESP: lungs clear to auscultation - no rales, rhonchi or wheezes  CV: regular rate and rhythm, normal S1 S2, no S3 or S4, no murmur, click or rub, no peripheral edema  ABDOMEN: soft, nontender, no hepatosplenomegaly, no masses and bowel sounds normal  MS: no gross musculoskeletal defects noted, no edema  SKIN: no suspicious lesions or rashes  NEURO: Normal strength and tone, mentation intact and speech normal  PSYCH: mentation appears normal, affect normal/bright  Diabetic foot exam: normal DP and PT pulses, no trophic changes or ulcerative lesions, and normal sensory exam            Signed Electronically by: Bryn Redd MD    "

## 2024-08-06 NOTE — PATIENT INSTRUCTIONS
Flu and COVID boosters this fall.    Let's increase your ozempic to 2 mg.      No changes in other meds.    Skin lesions look benign.    Bryn Redd MD  Internal Medicine and Pediatrics

## 2024-08-21 ENCOUNTER — TELEPHONE (OUTPATIENT)
Dept: PEDIATRICS | Facility: CLINIC | Age: 49
End: 2024-08-21
Payer: COMMERCIAL

## 2024-08-21 NOTE — TELEPHONE ENCOUNTER
Prior Authorization Retail Medication Request    Medication/Dose: Semaglutide, 2 MG/DOSE, (OZEMPIC) 8 MG/3ML pen  Diagnosis and ICD code (if different than what is on RX):  Type 2 diabetes, HbA1C goal < 8% (H) [E11.9]  - Primary   New/renewal/insurance change PA/secondary ins. PA:  Previously Tried and Failed:  see chart  Rationale:  see chart    Insurance   Primary: BCBS - BCBS OUT OF STATE   Insurance ID:  [id number]      Secondary (if applicable):  Insurance ID:      Pharmacy Information (if different than what is on RX)  Name:    Phone:    Fax:

## 2024-08-22 NOTE — TELEPHONE ENCOUNTER
Prior Authorization Approval    Authorization Effective Date: 8/22/2024  Authorization Expiration Date: 8/22/2025  Medication: Semaglutide, 2 MG/DOSE, (OZEMPIC) 8 MG/3ML pen-APPROVED  Reference #: [id number]   Insurance Company: Other (see comments)Comment:  Prompt PA BUZZ  Which Pharmacy is filling the prescription (Not needed for infusion/clinic administered): [id number] - [place], [place] - [address] AT [facility] [address]  Pharmacy Notified: Yes  Patient Notified: Instructed pharmacy to notify patient when script is ready to /ship.

## 2024-08-22 NOTE — TELEPHONE ENCOUNTER
Luzmaria calling from [facility] to inform prior authorization request is needed for pt's Ozempic.    She informs prior authorization request should be completed by provider using the following website- [employer].Patient Feed    Routing to PA team to inform    Antionette BAUTISTA RN

## 2024-08-22 NOTE — TELEPHONE ENCOUNTER
Retail Pharmacy Prior Authorization Team   Phone: [phone]    PA Initiation    Medication: OZEMPIC (2 MG/DOSE) 8 MG/3ML SC SOPN  Insurance Company: Other (see comments)Comment:  Prompt PA BUZZ  Pharmacy [facility]illing the Rx[id number] [place], [place] - [address]  Filling Pharmacy Phone: [phone]  Filling Pharmacy Fax:    Start Date: 8/22/2024

## 2024-08-26 ENCOUNTER — TRANSFERRED RECORDS (OUTPATIENT)
Dept: HEALTH INFORMATION MANAGEMENT | Facility: CLINIC | Age: 49
End: 2024-08-26
Payer: COMMERCIAL

## 2024-09-05 ENCOUNTER — IMMUNIZATION (OUTPATIENT)
Dept: PEDIATRICS | Facility: CLINIC | Age: 49
End: 2024-09-05
Payer: COMMERCIAL

## 2024-09-05 DIAGNOSIS — Z23 NEED FOR PROPHYLACTIC VACCINATION AND INOCULATION AGAINST INFLUENZA: Primary | ICD-10-CM

## 2024-09-05 PROCEDURE — 99207 PR NO CHARGE NURSE ONLY: CPT

## 2024-09-05 PROCEDURE — 90471 IMMUNIZATION ADMIN: CPT

## 2024-09-05 PROCEDURE — 90656 IIV3 VACC NO PRSV 0.5 ML IM: CPT

## 2024-11-02 ENCOUNTER — MYC REFILL (OUTPATIENT)
Dept: PEDIATRICS | Facility: CLINIC | Age: 49
End: 2024-11-02
Payer: COMMERCIAL

## 2024-11-02 DIAGNOSIS — E11.69 TYPE 2 DIABETES MELLITUS WITH OTHER SPECIFIED COMPLICATION, WITHOUT LONG-TERM CURRENT USE OF INSULIN (H): ICD-10-CM

## 2024-11-10 DIAGNOSIS — E11.69 TYPE 2 DIABETES MELLITUS WITH OTHER SPECIFIED COMPLICATION, WITHOUT LONG-TERM CURRENT USE OF INSULIN (H): ICD-10-CM

## 2024-11-11 ENCOUNTER — MYC REFILL (OUTPATIENT)
Dept: PEDIATRICS | Facility: CLINIC | Age: 49
End: 2024-11-11
Payer: COMMERCIAL

## 2024-11-11 DIAGNOSIS — E11.69 TYPE 2 DIABETES MELLITUS WITH OTHER SPECIFIED COMPLICATION, WITHOUT LONG-TERM CURRENT USE OF INSULIN (H): ICD-10-CM

## 2024-11-11 RX ORDER — METFORMIN HYDROCHLORIDE 500 MG/1
TABLET, EXTENDED RELEASE ORAL
Qty: 360 TABLET | Refills: 1 | Status: SHIPPED | OUTPATIENT
Start: 2024-11-11

## 2024-11-11 RX ORDER — METFORMIN HYDROCHLORIDE 500 MG/1
2000 TABLET, EXTENDED RELEASE ORAL DAILY
Qty: 360 TABLET | Refills: 3 | OUTPATIENT
Start: 2024-11-11

## 2024-11-25 ENCOUNTER — E-VISIT (OUTPATIENT)
Dept: PEDIATRICS | Facility: CLINIC | Age: 49
End: 2024-11-25
Payer: COMMERCIAL

## 2024-11-25 DIAGNOSIS — L64.9 MALE PATTERN BALDNESS: Primary | ICD-10-CM

## 2024-11-26 RX ORDER — FINASTERIDE 1 MG/1
1 TABLET, FILM COATED ORAL DAILY
Qty: 90 TABLET | Refills: 3 | Status: SHIPPED | OUTPATIENT
Start: 2024-11-26

## 2025-01-31 ENCOUNTER — LAB (OUTPATIENT)
Dept: LAB | Facility: CLINIC | Age: 50
End: 2025-01-31
Payer: COMMERCIAL

## 2025-01-31 DIAGNOSIS — Z11.4 SCREENING FOR HIV (HUMAN IMMUNODEFICIENCY VIRUS): Primary | ICD-10-CM

## 2025-02-02 SDOH — HEALTH STABILITY: PHYSICAL HEALTH: ON AVERAGE, HOW MANY DAYS PER WEEK DO YOU ENGAGE IN MODERATE TO STRENUOUS EXERCISE (LIKE A BRISK WALK)?: 3 DAYS

## 2025-02-02 SDOH — HEALTH STABILITY: PHYSICAL HEALTH: ON AVERAGE, HOW MANY MINUTES DO YOU ENGAGE IN EXERCISE AT THIS LEVEL?: 40 MIN

## 2025-02-02 ASSESSMENT — SOCIAL DETERMINANTS OF HEALTH (SDOH): HOW OFTEN DO YOU GET TOGETHER WITH FRIENDS OR RELATIVES?: ONCE A WEEK

## 2025-03-12 SDOH — HEALTH STABILITY: PHYSICAL HEALTH: ON AVERAGE, HOW MANY MINUTES DO YOU ENGAGE IN EXERCISE AT THIS LEVEL?: 40 MIN

## 2025-03-12 SDOH — HEALTH STABILITY: PHYSICAL HEALTH: ON AVERAGE, HOW MANY DAYS PER WEEK DO YOU ENGAGE IN MODERATE TO STRENUOUS EXERCISE (LIKE A BRISK WALK)?: 3 DAYS

## 2025-03-12 ASSESSMENT — SOCIAL DETERMINANTS OF HEALTH (SDOH): HOW OFTEN DO YOU GET TOGETHER WITH FRIENDS OR RELATIVES?: ONCE A WEEK

## 2025-03-17 ENCOUNTER — OFFICE VISIT (OUTPATIENT)
Dept: PEDIATRICS | Facility: CLINIC | Age: 50
End: 2025-03-17
Attending: INTERNAL MEDICINE
Payer: COMMERCIAL

## 2025-03-17 VITALS
DIASTOLIC BLOOD PRESSURE: 89 MMHG | HEART RATE: 80 BPM | BODY MASS INDEX: 31.21 KG/M2 | HEIGHT: 73 IN | TEMPERATURE: 97.9 F | RESPIRATION RATE: 12 BRPM | SYSTOLIC BLOOD PRESSURE: 131 MMHG | OXYGEN SATURATION: 98 % | WEIGHT: 235.5 LBS

## 2025-03-17 DIAGNOSIS — E11.69 TYPE 2 DIABETES MELLITUS WITH OTHER SPECIFIED COMPLICATION, WITHOUT LONG-TERM CURRENT USE OF INSULIN (H): ICD-10-CM

## 2025-03-17 DIAGNOSIS — Z00.00 ROUTINE GENERAL MEDICAL EXAMINATION AT A HEALTH CARE FACILITY: Primary | ICD-10-CM

## 2025-03-17 DIAGNOSIS — R03.0 ELEVATED BLOOD PRESSURE READING WITHOUT DIAGNOSIS OF HYPERTENSION: ICD-10-CM

## 2025-03-17 DIAGNOSIS — E66.09 CLASS 1 OBESITY DUE TO EXCESS CALORIES WITH SERIOUS COMORBIDITY AND BODY MASS INDEX (BMI) OF 31.0 TO 31.9 IN ADULT: ICD-10-CM

## 2025-03-17 DIAGNOSIS — E11.9 TYPE 2 DIABETES, HBA1C GOAL < 8% (H): ICD-10-CM

## 2025-03-17 DIAGNOSIS — K76.0 HEPATIC STEATOSIS: ICD-10-CM

## 2025-03-17 DIAGNOSIS — E66.811 CLASS 1 OBESITY DUE TO EXCESS CALORIES WITH SERIOUS COMORBIDITY AND BODY MASS INDEX (BMI) OF 31.0 TO 31.9 IN ADULT: ICD-10-CM

## 2025-03-17 PROCEDURE — 99207 PR FOOT EXAM NO CHARGE: CPT | Performed by: INTERNAL MEDICINE

## 2025-03-17 PROCEDURE — 3079F DIAST BP 80-89 MM HG: CPT | Performed by: INTERNAL MEDICINE

## 2025-03-17 PROCEDURE — 99396 PREV VISIT EST AGE 40-64: CPT | Performed by: INTERNAL MEDICINE

## 2025-03-17 PROCEDURE — 99213 OFFICE O/P EST LOW 20 MIN: CPT | Mod: 25 | Performed by: INTERNAL MEDICINE

## 2025-03-17 PROCEDURE — 3075F SYST BP GE 130 - 139MM HG: CPT | Performed by: INTERNAL MEDICINE

## 2025-03-17 PROCEDURE — G2211 COMPLEX E/M VISIT ADD ON: HCPCS | Performed by: INTERNAL MEDICINE

## 2025-03-17 RX ORDER — ASPIRIN 81 MG/1
81 TABLET ORAL DAILY
COMMUNITY
Start: 2025-03-17

## 2025-03-17 RX ORDER — METFORMIN HYDROCHLORIDE 500 MG/1
2000 TABLET, EXTENDED RELEASE ORAL
Qty: 360 TABLET | Refills: 3 | Status: SHIPPED | OUTPATIENT
Start: 2025-03-17

## 2025-03-17 RX ORDER — CYCLOBENZAPRINE HCL 5 MG
1 TABLET ORAL
COMMUNITY
Start: 2024-08-08 | End: 2025-03-17

## 2025-03-17 RX ORDER — METHYLPREDNISOLONE 4 MG/1
TABLET ORAL
COMMUNITY
Start: 2024-08-26 | End: 2025-03-17

## 2025-03-17 NOTE — PATIENT INSTRUCTIONS
"Shingrix at age 52.     Colonoscopy repeat in 2027.    No changes in meds, but work on the carbs and weight:    Target 10 pounds of weight less.    Try to limit complex carbs (rice, bread, pasta, potatoes, cereals) and simple carbs (pop, juice, alcohol, and even milk.)  Eating more lean proteins (chicken, fish, eggs, beans, nuts) and unlimited vegetables and fruits can definitely help as well.     In general, try to spread meals out during the day, eating smaller breakfasts, lunches and dinners--and having healthy snacks in between.  It's a good idea to limit your carbs at mealtimes to 60-75 grams of carbs, and to limit your carbs at snacktimes to 15-30 grams of carbs.  (Check the food labels to add up these carbs.)     Cardio exercise is probably the most helpful thing for your heart and future health.  Try to get about 30 minutes of sustained cardio exercise (biking, running, swimming, fast walking) every other day or even every day.  Keeping your heart rate at a \"target\" of (220 - your age) x 0.80 will be your goal.  For example, if you're 60 years old, this would be (220 - 60) x 0.80 = 128 beats per minute for those 30 minutes of exercise.       Patient Education   Preventive Care Advice   This is general advice given by our system to help you stay healthy. However, your care team may have specific advice just for you. Please talk to your care team about your preventive care needs.  Nutrition  Eat 5 or more servings of fruits and vegetables each day.  Try wheat bread, brown rice and whole grain pasta (instead of white bread, rice, and pasta).  Get enough calcium and vitamin D. Check the label on foods and aim for 100% of the RDA (recommended daily allowance).  Lifestyle  Exercise at least 150 minutes each week  (30 minutes a day, 5 days a week).  Do muscle strengthening activities 2 days a week. These help control your weight and prevent disease.  No smoking.  Wear sunscreen to prevent skin cancer.  Have a " dental exam and cleaning every 6 months.  Yearly exams  See your health care team every year to talk about:  Any changes in your health.  Any medicines your care team has prescribed.  Preventive care, family planning, and ways to prevent chronic diseases.  Shots (vaccines)   HPV shots (up to age 26), if you've never had them before.  Hepatitis B shots (up to age 59), if you've never had them before.  COVID-19 shot: Get this shot when it's due.  Flu shot: Get a flu shot every year.  Tetanus shot: Get a tetanus shot every 10 years.  Pneumococcal, hepatitis A, and RSV shots: Ask your care team if you need these based on your risk.  Shingles shot (for age 50 and up)  General health tests  Diabetes screening:  Starting at age 35, Get screened for diabetes at least every 3 years.  If you are younger than age 35, ask your care team if you should be screened for diabetes.  Cholesterol test: At age 39, start having a cholesterol test every 5 years, or more often if advised.  Bone density scan (DEXA): At age 50, ask your care team if you should have this scan for osteoporosis (brittle bones).  Hepatitis C: Get tested at least once in your life.  STIs (sexually transmitted infections)  Before age 24: Ask your care team if you should be screened for STIs.  After age 24: Get screened for STIs if you're at risk. You are at risk for STIs (including HIV) if:  You are sexually active with more than one person.  You don't use condoms every time.  You or a partner was diagnosed with a sexually transmitted infection.  If you are at risk for HIV, ask about PrEP medicine to prevent HIV.  Get tested for HIV at least once in your life, whether you are at risk for HIV or not.  Cancer screening tests  Cervical cancer screening: If you have a cervix, begin getting regular cervical cancer screening tests starting at age 21.  Breast cancer scan (mammogram): If you've ever had breasts, begin having regular mammograms starting at age 40. This is  a scan to check for breast cancer.  Colon cancer screening: It is important to start screening for colon cancer at age 45.  Have a colonoscopy test every 10 years (or more often if you're at risk) Or, ask your provider about stool tests like a FIT test every year or Cologuard test every 3 years.  To learn more about your testing options, visit:   .  For help making a decision, visit:   https://bit.ly/yc05880.  Prostate cancer screening test: If you have a prostate, ask your care team if a prostate cancer screening test (PSA) at age 55 is right for you.  Lung cancer screening: If you are a current or former smoker ages 50 to 80, ask your care team if ongoing lung cancer screenings are right for you.  For informational purposes only. Not to replace the advice of your health care provider. Copyright   2023 [employer]. All rights reserved. Clinically reviewed by the [facility]. [employer] [id number] - REV 01/24.

## 2025-03-17 NOTE — PROGRESS NOTES
"  Assessment & Plan     Type 2 diabetes mellitus with other specified complication, without long-term current use of insulin (H)  Weight loss discussed.   - metFORMIN (GLUCOPHAGE XR) 500 MG 24 hr tablet; Take 4 tablets (2,000 mg) by mouth daily (with dinner). .provile    Type 2 diabetes, HbA1C goal < 8% (H)  Refilled.   - Semaglutide, 2 MG/DOSE, (OZEMPIC) 8 MG/3ML pen; Inject 2 mg subcutaneously every 7 days.    Routine general medical examination at a health care facility  Discussed diet, exercise, testicular self exam, blood pressure, cholesterol, and need for cancer surveillance at appropriate ages.     Blood pressure: borderline; weight loss should help.     Fatty liver:  weight loss should help as well.     Patient has been advised of split billing requirements and indicates understanding: Yes        BMI  Estimated body mass index is 31.07 kg/m  as calculated from the following:    Height as of this encounter: 1.854 m (6' 1\").    Weight as of this encounter: 106.8 kg (235 lb 8 oz).   Weight management plan: Discussed healthy diet and exercise guidelines    Counseling  Appropriate preventive services were addressed with this patient via screening, questionnaire, or discussion as appropriate for fall prevention, nutrition, physical activity, Tobacco-use cessation, social engagement, weight loss and cognition.  Checklist reviewing preventive services available has been given to the patient.  Reviewed patient's diet, addressing concerns and/or questions.   He is at risk for lack of exercise and has been provided with information to increase physical activity for the benefit of his well-being.       See Patient Instructions    Chrissy Morris is a 49 year old, presenting for the following health issues:  Physical      3/17/2025     3:13 PM   Additional Questions   Roomed by HIRA TRIPLETT      Sugars running 140s usually in the AM.      Blood pressure borderline.    Drinks about 475 mg of caffeine. " "              Review of Systems  Constitutional, HEENT, cardiovascular, pulmonary, GI, , musculoskeletal, neuro, skin, endocrine and psych systems are negative, except as otherwise noted.      Objective    BP (!) 141/90 (BP Location: Right arm, Patient Position: Sitting, Cuff Size: Adult Large)   Pulse 80   Temp 97.9  F (36.6  C) (Temporal)   Resp 12   Ht 1.854 m (6' 1\")   Wt 106.8 kg (235 lb 8 oz)   SpO2 98%   BMI 31.07 kg/m    Body mass index is 31.07 kg/m .  Physical Exam   GENERAL: alert and no distress  EYES: Eyes grossly normal to inspection, PERRL and conjunctivae and sclerae normal  HENT: ear canals and TM's normal, nose and mouth without ulcers or lesions  NECK: no adenopathy, no asymmetry, masses, or scars  RESP: lungs clear to auscultation - no rales, rhonchi or wheezes  CV: regular rate and rhythm, normal S1 S2, no S3 or S4, no murmur, click or rub, no peripheral edema  ABDOMEN: soft, nontender, no hepatosplenomegaly, no masses and bowel sounds normal  MS: no gross musculoskeletal defects noted, no edema  SKIN: no suspicious lesions or rashes  NEURO: Normal strength and tone, mentation intact and speech normal  PSYCH: mentation appears normal, affect normal/bright  Diabetic foot exam: normal DP and PT pulses, no trophic changes or ulcerative lesions, and normal sensory exam            Signed Electronically by: Bryn Redd MD    "

## 2025-03-20 ENCOUNTER — MYC MEDICAL ADVICE (OUTPATIENT)
Dept: PEDIATRICS | Facility: CLINIC | Age: 50
End: 2025-03-20
Payer: COMMERCIAL

## 2025-07-03 ENCOUNTER — TRANSFERRED RECORDS (OUTPATIENT)
Dept: HEALTH INFORMATION MANAGEMENT | Facility: CLINIC | Age: 50
End: 2025-07-03
Payer: COMMERCIAL

## 2025-07-30 ENCOUNTER — TRANSFERRED RECORDS (OUTPATIENT)
Dept: HEALTH INFORMATION MANAGEMENT | Facility: CLINIC | Age: 50
End: 2025-07-30
Payer: COMMERCIAL

## 2025-08-02 ENCOUNTER — HEALTH MAINTENANCE LETTER (OUTPATIENT)
Age: 50
End: 2025-08-02

## 2025-08-11 ENCOUNTER — LAB (OUTPATIENT)
Dept: LAB | Facility: CLINIC | Age: 50
End: 2025-08-11
Payer: COMMERCIAL

## 2025-08-11 DIAGNOSIS — E11.69 TYPE 2 DIABETES MELLITUS WITH OTHER SPECIFIED COMPLICATION, WITHOUT LONG-TERM CURRENT USE OF INSULIN (H): Primary | ICD-10-CM

## 2025-08-11 LAB
EST. AVERAGE GLUCOSE BLD GHB EST-MCNC: 169 MG/DL
HBA1C MFR BLD: 7.5 % (ref 0–5.6)

## 2025-08-11 PROCEDURE — 83036 HEMOGLOBIN GLYCOSYLATED A1C: CPT

## 2025-08-11 PROCEDURE — 36415 COLL VENOUS BLD VENIPUNCTURE: CPT

## (undated) RX ORDER — FENTANYL CITRATE 50 UG/ML
INJECTION, SOLUTION INTRAMUSCULAR; INTRAVENOUS
Status: DISPENSED
Start: 2024-01-05